# Patient Record
Sex: FEMALE | Race: BLACK OR AFRICAN AMERICAN | NOT HISPANIC OR LATINO | Employment: UNEMPLOYED | URBAN - NONMETROPOLITAN AREA
[De-identification: names, ages, dates, MRNs, and addresses within clinical notes are randomized per-mention and may not be internally consistent; named-entity substitution may affect disease eponyms.]

---

## 2017-01-01 ENCOUNTER — HOSPITAL ENCOUNTER (INPATIENT)
Facility: HOSPITAL | Age: 82
LOS: 5 days | End: 2017-11-30
Attending: FAMILY MEDICINE | Admitting: INTERNAL MEDICINE

## 2017-01-01 ENCOUNTER — OFFICE VISIT (OUTPATIENT)
Dept: PODIATRY | Facility: CLINIC | Age: 82
End: 2017-01-01

## 2017-01-01 ENCOUNTER — TELEPHONE (OUTPATIENT)
Dept: FAMILY MEDICINE CLINIC | Facility: CLINIC | Age: 82
End: 2017-01-01

## 2017-01-01 ENCOUNTER — APPOINTMENT (OUTPATIENT)
Dept: GENERAL RADIOLOGY | Facility: HOSPITAL | Age: 82
End: 2017-01-01

## 2017-01-01 ENCOUNTER — HOSPITAL ENCOUNTER (INPATIENT)
Facility: HOSPITAL | Age: 82
LOS: 3 days | Discharge: HOME HEALTH | End: 2017-02-19
Attending: EMERGENCY MEDICINE | Admitting: INTERNAL MEDICINE

## 2017-01-01 ENCOUNTER — OFFICE VISIT (OUTPATIENT)
Dept: FAMILY MEDICINE CLINIC | Facility: CLINIC | Age: 82
End: 2017-01-01

## 2017-01-01 VITALS — HEIGHT: 60 IN | WEIGHT: 119 LBS | BODY MASS INDEX: 23.36 KG/M2

## 2017-01-01 VITALS
BODY MASS INDEX: 23.39 KG/M2 | TEMPERATURE: 96.8 F | SYSTOLIC BLOOD PRESSURE: 142 MMHG | RESPIRATION RATE: 18 BRPM | WEIGHT: 119.13 LBS | DIASTOLIC BLOOD PRESSURE: 65 MMHG | OXYGEN SATURATION: 90 % | HEART RATE: 80 BPM | HEIGHT: 60 IN

## 2017-01-01 VITALS
SYSTOLIC BLOOD PRESSURE: 110 MMHG | HEIGHT: 60 IN | WEIGHT: 119 LBS | HEART RATE: 64 BPM | DIASTOLIC BLOOD PRESSURE: 70 MMHG | OXYGEN SATURATION: 95 % | BODY MASS INDEX: 23.36 KG/M2

## 2017-01-01 VITALS — BODY MASS INDEX: 23.24 KG/M2 | HEIGHT: 60 IN

## 2017-01-01 VITALS — WEIGHT: 119 LBS | HEIGHT: 60 IN | BODY MASS INDEX: 23.36 KG/M2

## 2017-01-01 VITALS
HEIGHT: 62 IN | WEIGHT: 121.47 LBS | BODY MASS INDEX: 22.35 KG/M2 | DIASTOLIC BLOOD PRESSURE: 40 MMHG | TEMPERATURE: 99 F | SYSTOLIC BLOOD PRESSURE: 60 MMHG | OXYGEN SATURATION: 80 % | HEART RATE: 66 BPM | RESPIRATION RATE: 12 BRPM

## 2017-01-01 VITALS — HEIGHT: 60 IN | BODY MASS INDEX: 23.36 KG/M2 | WEIGHT: 119 LBS

## 2017-01-01 VITALS — WEIGHT: 119 LBS | BODY MASS INDEX: 23.36 KG/M2 | HEIGHT: 60 IN

## 2017-01-01 DIAGNOSIS — I50.23 ACUTE ON CHRONIC SYSTOLIC CONGESTIVE HEART FAILURE (HCC): Primary | ICD-10-CM

## 2017-01-01 DIAGNOSIS — G89.29 CHRONIC TOE PAIN, BILATERAL: ICD-10-CM

## 2017-01-01 DIAGNOSIS — M79.674 CHRONIC TOE PAIN, BILATERAL: ICD-10-CM

## 2017-01-01 DIAGNOSIS — B35.1 ONYCHOMYCOSIS: Primary | ICD-10-CM

## 2017-01-01 DIAGNOSIS — L97.512 ULCER OF RIGHT SECOND TOE WITH FAT LAYER EXPOSED (HCC): Primary | ICD-10-CM

## 2017-01-01 DIAGNOSIS — M79.674 CHRONIC PAIN OF TOE OF RIGHT FOOT: ICD-10-CM

## 2017-01-01 DIAGNOSIS — T68.XXXA HYPOTHERMIA, INITIAL ENCOUNTER: ICD-10-CM

## 2017-01-01 DIAGNOSIS — I50.9 ACUTE ON CHRONIC CONGESTIVE HEART FAILURE, UNSPECIFIED CONGESTIVE HEART FAILURE TYPE: Primary | ICD-10-CM

## 2017-01-01 DIAGNOSIS — G89.29 CHRONIC PAIN OF TOE OF LEFT FOOT: ICD-10-CM

## 2017-01-01 DIAGNOSIS — M79.675 CHRONIC TOE PAIN, BILATERAL: ICD-10-CM

## 2017-01-01 DIAGNOSIS — E86.0 DEHYDRATION: ICD-10-CM

## 2017-01-01 DIAGNOSIS — M79.675 CHRONIC PAIN OF TOE OF LEFT FOOT: ICD-10-CM

## 2017-01-01 DIAGNOSIS — E11.8 DIABETIC FOOT (HCC): ICD-10-CM

## 2017-01-01 DIAGNOSIS — F03.90 SENILE DEMENTIA, WITHOUT BEHAVIORAL DISTURBANCE (HCC): ICD-10-CM

## 2017-01-01 DIAGNOSIS — E11.9 TYPE 2 DIABETES MELLITUS WITHOUT COMPLICATION, UNSPECIFIED LONG TERM INSULIN USE STATUS: ICD-10-CM

## 2017-01-01 DIAGNOSIS — G89.29 CHRONIC PAIN OF TOE OF RIGHT FOOT: ICD-10-CM

## 2017-01-01 DIAGNOSIS — J18.9 PNEUMONIA DUE TO ORGANISM: Primary | ICD-10-CM

## 2017-01-01 DIAGNOSIS — B35.1 ONYCHOMYCOSIS: ICD-10-CM

## 2017-01-01 LAB
ALBUMIN SERPL-MCNC: 2.2 G/DL (ref 3.4–4.8)
ALBUMIN SERPL-MCNC: 2.5 G/DL (ref 3.4–4.8)
ALBUMIN SERPL-MCNC: 2.8 G/DL (ref 3.4–4.8)
ALBUMIN SERPL-MCNC: 3.5 G/DL (ref 3.4–4.8)
ALBUMIN/GLOB SERPL: 0.8 G/DL (ref 1.1–1.8)
ALBUMIN/GLOB SERPL: 0.8 G/DL (ref 1.1–1.8)
ALBUMIN/GLOB SERPL: 0.9 G/DL (ref 1.1–1.8)
ALBUMIN/GLOB SERPL: 1 G/DL (ref 1.1–1.8)
ALP SERPL-CCNC: 122 U/L (ref 38–126)
ALP SERPL-CCNC: 135 U/L (ref 38–126)
ALP SERPL-CCNC: 138 U/L (ref 38–126)
ALP SERPL-CCNC: 89 U/L (ref 38–126)
ALT SERPL W P-5'-P-CCNC: 28 U/L (ref 9–52)
ALT SERPL W P-5'-P-CCNC: 34 U/L (ref 9–52)
ALT SERPL W P-5'-P-CCNC: 36 U/L (ref 9–52)
ALT SERPL W P-5'-P-CCNC: 40 U/L (ref 9–52)
ANION GAP SERPL CALCULATED.3IONS-SCNC: 11 MMOL/L (ref 5–15)
ANION GAP SERPL CALCULATED.3IONS-SCNC: 11 MMOL/L (ref 5–15)
ANION GAP SERPL CALCULATED.3IONS-SCNC: 5 MMOL/L (ref 5–15)
ANION GAP SERPL CALCULATED.3IONS-SCNC: 6 MMOL/L (ref 5–15)
ANION GAP SERPL CALCULATED.3IONS-SCNC: 6 MMOL/L (ref 5–15)
ANION GAP SERPL CALCULATED.3IONS-SCNC: 8 MMOL/L (ref 5–15)
APTT PPP: <20 SECONDS (ref 20–40.3)
ARTERIAL PATENCY WRIST A: ABNORMAL
AST SERPL-CCNC: 146 U/L (ref 14–36)
AST SERPL-CCNC: 19 U/L (ref 14–36)
AST SERPL-CCNC: 31 U/L (ref 14–36)
AST SERPL-CCNC: 71 U/L (ref 14–36)
ATMOSPHERIC PRESS: ABNORMAL MMHG
BACTERIA SPEC AEROBE CULT: NORMAL
BACTERIA UR QL AUTO: ABNORMAL /HPF
BASE EXCESS BLDA CALC-SCNC: -5.1 MMOL/L (ref -2.4–2.4)
BASOPHILS # BLD AUTO: 0.01 10*3/MM3 (ref 0–0.2)
BASOPHILS NFR BLD AUTO: 0.1 % (ref 0–2)
BDY SITE: ABNORMAL
BILIRUB SERPL-MCNC: 0.3 MG/DL (ref 0.2–1.3)
BILIRUB SERPL-MCNC: <0.1 MG/DL (ref 0.2–1.3)
BILIRUB UR QL STRIP: NEGATIVE
BUN BLD-MCNC: 26 MG/DL (ref 7–21)
BUN BLD-MCNC: 39 MG/DL (ref 7–21)
BUN BLD-MCNC: 40 MG/DL (ref 7–21)
BUN BLD-MCNC: 40 MG/DL (ref 7–21)
BUN BLD-MCNC: 42 MG/DL (ref 7–21)
BUN BLD-MCNC: 49 MG/DL (ref 7–21)
BUN/CREAT SERPL: 19.9 (ref 7–25)
BUN/CREAT SERPL: 20.5 (ref 7–25)
BUN/CREAT SERPL: 23.2 (ref 7–25)
BUN/CREAT SERPL: 25.1 (ref 7–25)
BUN/CREAT SERPL: 35.5 (ref 7–25)
BUN/CREAT SERPL: 37.7 (ref 7–25)
CA-I BLD-MCNC: 5 MG/DL (ref 4.5–4.9)
CA-I BLD-MCNC: 5.1 MG/DL (ref 4.5–4.9)
CALCIUM SPEC-SCNC: 8.5 MG/DL (ref 8.4–10.2)
CALCIUM SPEC-SCNC: 8.8 MG/DL (ref 8.4–10.2)
CALCIUM SPEC-SCNC: 9 MG/DL (ref 8.4–10.2)
CALCIUM SPEC-SCNC: 9.1 MG/DL (ref 8.4–10.2)
CALCIUM SPEC-SCNC: 9.1 MG/DL (ref 8.4–10.2)
CALCIUM SPEC-SCNC: 9.7 MG/DL (ref 8.4–10.2)
CHLORIDE SERPL-SCNC: 117 MMOL/L (ref 95–110)
CHLORIDE SERPL-SCNC: 119 MMOL/L (ref 95–110)
CHLORIDE SERPL-SCNC: 120 MMOL/L (ref 95–110)
CHLORIDE SERPL-SCNC: 92 MMOL/L (ref 95–110)
CHLORIDE SERPL-SCNC: 95 MMOL/L (ref 95–110)
CHLORIDE SERPL-SCNC: 99 MMOL/L (ref 95–110)
CLARITY UR: CLEAR
CO2 BLDA-SCNC: 19.9 MMOL/L (ref 23–27)
CO2 SERPL-SCNC: 18 MMOL/L (ref 22–31)
CO2 SERPL-SCNC: 20 MMOL/L (ref 22–31)
CO2 SERPL-SCNC: 22 MMOL/L (ref 22–31)
CO2 SERPL-SCNC: 26 MMOL/L (ref 22–31)
CO2 SERPL-SCNC: 27 MMOL/L (ref 22–31)
CO2 SERPL-SCNC: 29 MMOL/L (ref 22–31)
COLOR UR: ABNORMAL
CREAT BLD-MCNC: 1.1 MG/DL (ref 0.5–1)
CREAT BLD-MCNC: 1.12 MG/DL (ref 0.5–1)
CREAT BLD-MCNC: 1.3 MG/DL (ref 0.5–1)
CREAT BLD-MCNC: 1.67 MG/DL (ref 0.5–1)
CREAT BLD-MCNC: 1.95 MG/DL (ref 0.5–1)
CREAT BLD-MCNC: 2.01 MG/DL (ref 0.5–1)
CRP SERPL-MCNC: 0.9 MG/DL (ref 0–1)
D-LACTATE SERPL-SCNC: 1.1 MMOL/L (ref 0.5–2)
D-LACTATE SERPL-SCNC: 1.3 MMOL/L (ref 0.5–2)
DEPRECATED RDW RBC AUTO: 45.4 FL (ref 36.4–46.3)
DEPRECATED RDW RBC AUTO: 57.8 FL (ref 36.4–46.3)
DEPRECATED RDW RBC AUTO: 58.8 FL (ref 36.4–46.3)
DEPRECATED RDW RBC AUTO: 59.3 FL (ref 36.4–46.3)
EOSINOPHIL # BLD AUTO: 0 10*3/MM3 (ref 0–0.7)
EOSINOPHIL # BLD AUTO: 0.02 10*3/MM3 (ref 0–0.7)
EOSINOPHIL # BLD AUTO: 0.03 10*3/MM3 (ref 0–0.7)
EOSINOPHIL NFR BLD AUTO: 0 % (ref 0–7)
EOSINOPHIL NFR BLD AUTO: 0.2 % (ref 0–7)
EOSINOPHIL NFR BLD AUTO: 0.4 % (ref 0–7)
ERYTHROCYTE [DISTWIDTH] IN BLOOD BY AUTOMATED COUNT: 13.9 % (ref 11.5–14.5)
ERYTHROCYTE [DISTWIDTH] IN BLOOD BY AUTOMATED COUNT: 18.3 % (ref 11.5–14.5)
ERYTHROCYTE [DISTWIDTH] IN BLOOD BY AUTOMATED COUNT: 18.4 % (ref 11.5–14.5)
ERYTHROCYTE [DISTWIDTH] IN BLOOD BY AUTOMATED COUNT: 18.7 % (ref 11.5–14.5)
GFR SERPL CREATININE-BSD FRML MDRD: 28 ML/MIN/1.73 (ref 39–90)
GFR SERPL CREATININE-BSD FRML MDRD: 29 ML/MIN/1.73 (ref 39–90)
GFR SERPL CREATININE-BSD FRML MDRD: 34 ML/MIN/1.73 (ref 39–90)
GFR SERPL CREATININE-BSD FRML MDRD: 46 ML/MIN/1.73 (ref 39–90)
GFR SERPL CREATININE-BSD FRML MDRD: 54 ML/MIN/1.73 (ref 39–90)
GFR SERPL CREATININE-BSD FRML MDRD: 56 ML/MIN/1.73 (ref 39–90)
GLOBULIN UR ELPH-MCNC: 2.5 GM/DL (ref 2.3–3.5)
GLOBULIN UR ELPH-MCNC: 2.8 GM/DL (ref 2.3–3.5)
GLOBULIN UR ELPH-MCNC: 3.4 GM/DL (ref 2.3–3.5)
GLOBULIN UR ELPH-MCNC: 3.7 GM/DL (ref 2.3–3.5)
GLUCOSE BLD-MCNC: 101 MG/DL (ref 60–100)
GLUCOSE BLD-MCNC: 145 MG/DL (ref 60–100)
GLUCOSE BLD-MCNC: 156 MG/DL (ref 60–100)
GLUCOSE BLD-MCNC: 159 MG/DL (ref 60–100)
GLUCOSE BLD-MCNC: 83 MG/DL (ref 60–100)
GLUCOSE BLD-MCNC: 87 MG/DL (ref 60–100)
GLUCOSE BLDA-MCNC: 84 MMOL/L
GLUCOSE UR STRIP-MCNC: NEGATIVE MG/DL
HCO3 BLDA-SCNC: 18.9 MMOL/L (ref 22–26)
HCT VFR BLD AUTO: 25.6 % (ref 35–45)
HCT VFR BLD AUTO: 27.7 % (ref 35–45)
HCT VFR BLD AUTO: 29.6 % (ref 35–45)
HCT VFR BLD AUTO: 34.4 % (ref 35–45)
HCT VFR BLD CALC: 29 % (ref 38–47)
HGB BLD-MCNC: 10.2 G/DL (ref 12–15.5)
HGB BLD-MCNC: 12.2 G/DL (ref 12–15.5)
HGB BLD-MCNC: 8.7 G/DL (ref 12–15.5)
HGB BLD-MCNC: 9.8 G/DL (ref 12–15.5)
HGB BLDA-MCNC: 9.7 G/DL (ref 12–16)
HGB UR QL STRIP.AUTO: NEGATIVE
HOLD SPECIMEN: NORMAL
HOLD SPECIMEN: NORMAL
HYALINE CASTS UR QL AUTO: ABNORMAL /LPF
IMM GRANULOCYTES # BLD: 0.01 10*3/MM3 (ref 0–0.02)
IMM GRANULOCYTES # BLD: 0.02 10*3/MM3 (ref 0–0.02)
IMM GRANULOCYTES # BLD: 0.06 10*3/MM3 (ref 0–0.02)
IMM GRANULOCYTES NFR BLD: 0.1 % (ref 0–0.5)
IMM GRANULOCYTES NFR BLD: 0.2 % (ref 0–0.5)
IMM GRANULOCYTES NFR BLD: 0.6 % (ref 0–0.5)
INR PPP: 0.91 (ref 0.8–1.2)
INR PPP: 0.95 (ref 0.8–1.2)
KETONES UR QL STRIP: ABNORMAL
LEUKOCYTE ESTERASE UR QL STRIP.AUTO: NEGATIVE
LYMPHOCYTES # BLD AUTO: 0.8 10*3/MM3 (ref 0.6–4.2)
LYMPHOCYTES # BLD AUTO: 1.09 10*3/MM3 (ref 0.6–4.2)
LYMPHOCYTES # BLD AUTO: 2.37 10*3/MM3 (ref 0.6–4.2)
LYMPHOCYTES NFR BLD AUTO: 12.8 % (ref 10–50)
LYMPHOCYTES NFR BLD AUTO: 30.5 % (ref 10–50)
LYMPHOCYTES NFR BLD AUTO: 8.1 % (ref 10–50)
MAGNESIUM SERPL-MCNC: 2.2 MG/DL (ref 1.6–2.3)
MCH RBC QN AUTO: 30.1 PG (ref 26.5–34)
MCH RBC QN AUTO: 30.4 PG (ref 26.5–34)
MCH RBC QN AUTO: 31.3 PG (ref 26.5–34)
MCH RBC QN AUTO: 31.5 PG (ref 26.5–34)
MCHC RBC AUTO-ENTMCNC: 34 G/DL (ref 31.4–36)
MCHC RBC AUTO-ENTMCNC: 34.5 G/DL (ref 31.4–36)
MCHC RBC AUTO-ENTMCNC: 35.4 G/DL (ref 31.4–36)
MCHC RBC AUTO-ENTMCNC: 35.5 G/DL (ref 31.4–36)
MCV RBC AUTO: 88.2 FL (ref 80–98)
MCV RBC AUTO: 88.4 FL (ref 80–98)
MCV RBC AUTO: 88.6 FL (ref 80–98)
MCV RBC AUTO: 89.1 FL (ref 80–98)
MODALITY: ABNORMAL
MONOCYTES # BLD AUTO: 0.33 10*3/MM3 (ref 0–0.9)
MONOCYTES # BLD AUTO: 0.34 10*3/MM3 (ref 0–0.9)
MONOCYTES # BLD AUTO: 0.67 10*3/MM3 (ref 0–0.9)
MONOCYTES NFR BLD AUTO: 3.3 % (ref 0–12)
MONOCYTES NFR BLD AUTO: 4 % (ref 0–12)
MONOCYTES NFR BLD AUTO: 8.6 % (ref 0–12)
NEUTROPHILS # BLD AUTO: 4.68 10*3/MM3 (ref 2–8.6)
NEUTROPHILS # BLD AUTO: 7.02 10*3/MM3 (ref 2–8.6)
NEUTROPHILS # BLD AUTO: 8.73 10*3/MM3 (ref 2–8.6)
NEUTROPHILS NFR BLD AUTO: 60.3 % (ref 37–80)
NEUTROPHILS NFR BLD AUTO: 82.7 % (ref 37–80)
NEUTROPHILS NFR BLD AUTO: 87.9 % (ref 37–80)
NITRITE UR QL STRIP: NEGATIVE
NRBC BLD MANUAL-RTO: 0.8 /100 WBC (ref 0–0)
NT-PROBNP SERPL-MCNC: 2070 PG/ML (ref 0–1800)
PCO2 BLDA: 31.5 MM HG (ref 35–45)
PH BLDA: 7.4 PH UNITS (ref 7.35–7.45)
PH UR STRIP.AUTO: 5.5 [PH] (ref 5–9)
PHOSPHATE SERPL-MCNC: 3 MG/DL (ref 2.4–4.4)
PLATELET # BLD AUTO: 165 10*3/MM3 (ref 150–450)
PLATELET # BLD AUTO: 209 10*3/MM3 (ref 150–450)
PLATELET # BLD AUTO: 219 10*3/MM3 (ref 150–450)
PLATELET # BLD AUTO: 278 10*3/MM3 (ref 150–450)
PMV BLD AUTO: 10.5 FL (ref 8–12)
PMV BLD AUTO: 11 FL (ref 8–12)
PMV BLD AUTO: 12 FL (ref 8–12)
PMV BLD AUTO: 9.6 FL (ref 8–12)
PO2 BLDA: 142.1 MM HG (ref 80–105)
POTASSIUM BLD-SCNC: 3.7 MMOL/L (ref 3.5–5.1)
POTASSIUM BLD-SCNC: 4 MMOL/L (ref 3.5–5.1)
POTASSIUM BLD-SCNC: 4.1 MMOL/L (ref 3.5–5.1)
POTASSIUM BLD-SCNC: 4.2 MMOL/L (ref 3.5–5.1)
POTASSIUM BLD-SCNC: 4.8 MMOL/L (ref 3.5–5.1)
POTASSIUM BLD-SCNC: 4.8 MMOL/L (ref 3.5–5.1)
POTASSIUM BLDA-SCNC: 3.86 MMOL/L (ref 3.6–4.9)
PROCALCITONIN SERPL-MCNC: 0.1 NG/ML (ref 0–0.08)
PROT SERPL-MCNC: 5 G/DL (ref 6.3–8.6)
PROT SERPL-MCNC: 5 G/DL (ref 6.3–8.6)
PROT SERPL-MCNC: 6.2 G/DL (ref 6.3–8.6)
PROT SERPL-MCNC: 7.2 G/DL (ref 6.3–8.6)
PROT UR QL STRIP: ABNORMAL
PROTHROMBIN TIME: 12.2 SECONDS (ref 11.1–15.3)
PROTHROMBIN TIME: 12.7 SECONDS (ref 11.1–15.3)
RBC # BLD AUTO: 2.89 10*6/MM3 (ref 3.77–5.16)
RBC # BLD AUTO: 3.11 10*6/MM3 (ref 3.77–5.16)
RBC # BLD AUTO: 3.35 10*6/MM3 (ref 3.77–5.16)
RBC # BLD AUTO: 3.9 10*6/MM3 (ref 3.77–5.16)
RBC # UR: ABNORMAL /HPF
REF LAB TEST METHOD: ABNORMAL
SAO2 % BLDCOA: 98.6 %
SODIUM BLD-SCNC: 126 MMOL/L (ref 137–145)
SODIUM BLD-SCNC: 129 MMOL/L (ref 137–145)
SODIUM BLD-SCNC: 132 MMOL/L (ref 137–145)
SODIUM BLD-SCNC: 145 MMOL/L (ref 137–145)
SODIUM BLD-SCNC: 149 MMOL/L (ref 137–145)
SODIUM BLD-SCNC: 150 MMOL/L (ref 137–145)
SODIUM BLDA-SCNC: 145.2 MMOL/L (ref 138–146)
SP GR UR STRIP: 1.02 (ref 1–1.03)
SQUAMOUS #/AREA URNS HPF: ABNORMAL /HPF
TROPONIN I SERPL-MCNC: 0.02 NG/ML
TSH SERPL DL<=0.05 MIU/L-ACNC: 8.55 MIU/ML (ref 0.46–4.68)
UROBILINOGEN UR QL STRIP: ABNORMAL
VANCOMYCIN SERPL-MCNC: 10.84 MCG/ML
VANCOMYCIN SERPL-MCNC: 19.8 MCG/ML
WBC NRBC COR # BLD: 7.77 10*3/MM3 (ref 3.2–9.8)
WBC NRBC COR # BLD: 7.94 10*3/MM3 (ref 3.2–9.8)
WBC NRBC COR # BLD: 8.5 10*3/MM3 (ref 3.2–9.8)
WBC NRBC COR # BLD: 9.93 10*3/MM3 (ref 3.2–9.8)
WBC UR QL AUTO: ABNORMAL /HPF
WHOLE BLOOD HOLD SPECIMEN: NORMAL
WHOLE BLOOD HOLD SPECIMEN: NORMAL

## 2017-01-01 PROCEDURE — 25010000002 VANCOMYCIN PER 500 MG: Performed by: FAMILY MEDICINE

## 2017-01-01 PROCEDURE — 99212 OFFICE O/P EST SF 10 MIN: CPT | Performed by: PODIATRIST

## 2017-01-01 PROCEDURE — 25010000002 ALBUMIN HUMAN 5% PER 50 ML: Performed by: INTERNAL MEDICINE

## 2017-01-01 PROCEDURE — 93005 ELECTROCARDIOGRAM TRACING: CPT | Performed by: EMERGENCY MEDICINE

## 2017-01-01 PROCEDURE — 99213 OFFICE O/P EST LOW 20 MIN: CPT | Performed by: GENERAL PRACTICE

## 2017-01-01 PROCEDURE — 99285 EMERGENCY DEPT VISIT HI MDM: CPT

## 2017-01-01 PROCEDURE — 25010000002 MEROPENEM PER 100 MG: Performed by: INTERNAL MEDICINE

## 2017-01-01 PROCEDURE — P9041 ALBUMIN (HUMAN),5%, 50ML: HCPCS | Performed by: INTERNAL MEDICINE

## 2017-01-01 PROCEDURE — 25010000002 DIGOXIN PER 500 MCG: Performed by: INTERNAL MEDICINE

## 2017-01-01 PROCEDURE — 25010000002 MORPHINE PER 10 MG: Performed by: FAMILY MEDICINE

## 2017-01-01 PROCEDURE — 85027 COMPLETE CBC AUTOMATED: CPT | Performed by: INTERNAL MEDICINE

## 2017-01-01 PROCEDURE — 94760 N-INVAS EAR/PLS OXIMETRY 1: CPT

## 2017-01-01 PROCEDURE — 25010000002 FUROSEMIDE PER 20 MG: Performed by: HOSPITALIST

## 2017-01-01 PROCEDURE — 25010000002 CEFTRIAXONE PER 250 MG: Performed by: FAMILY MEDICINE

## 2017-01-01 PROCEDURE — 25010000002 PIPERACILLIN SOD-TAZOBACTAM PER 1 G: Performed by: INTERNAL MEDICINE

## 2017-01-01 PROCEDURE — 83880 ASSAY OF NATRIURETIC PEPTIDE: CPT | Performed by: EMERGENCY MEDICINE

## 2017-01-01 PROCEDURE — 36600 WITHDRAWAL OF ARTERIAL BLOOD: CPT

## 2017-01-01 PROCEDURE — 81001 URINALYSIS AUTO W/SCOPE: CPT | Performed by: FAMILY MEDICINE

## 2017-01-01 PROCEDURE — 71010 HC CHEST PA OR AP: CPT

## 2017-01-01 PROCEDURE — 85025 COMPLETE CBC W/AUTO DIFF WBC: CPT | Performed by: INTERNAL MEDICINE

## 2017-01-01 PROCEDURE — G0378 HOSPITAL OBSERVATION PER HR: HCPCS

## 2017-01-01 PROCEDURE — 80202 ASSAY OF VANCOMYCIN: CPT | Performed by: INTERNAL MEDICINE

## 2017-01-01 PROCEDURE — 94640 AIRWAY INHALATION TREATMENT: CPT

## 2017-01-01 PROCEDURE — 85610 PROTHROMBIN TIME: CPT | Performed by: FAMILY MEDICINE

## 2017-01-01 PROCEDURE — 80053 COMPREHEN METABOLIC PANEL: CPT | Performed by: INTERNAL MEDICINE

## 2017-01-01 PROCEDURE — 94799 UNLISTED PULMONARY SVC/PX: CPT

## 2017-01-01 PROCEDURE — 25010000002 AZITHROMYCIN PER 500 MG: Performed by: FAMILY MEDICINE

## 2017-01-01 PROCEDURE — 83605 ASSAY OF LACTIC ACID: CPT | Performed by: EMERGENCY MEDICINE

## 2017-01-01 PROCEDURE — 84100 ASSAY OF PHOSPHORUS: CPT | Performed by: FAMILY MEDICINE

## 2017-01-01 PROCEDURE — 85610 PROTHROMBIN TIME: CPT | Performed by: EMERGENCY MEDICINE

## 2017-01-01 PROCEDURE — 93005 ELECTROCARDIOGRAM TRACING: CPT | Performed by: FAMILY MEDICINE

## 2017-01-01 PROCEDURE — 11721 DEBRIDE NAIL 6 OR MORE: CPT | Performed by: PODIATRIST

## 2017-01-01 PROCEDURE — 83735 ASSAY OF MAGNESIUM: CPT | Performed by: FAMILY MEDICINE

## 2017-01-01 PROCEDURE — 84145 PROCALCITONIN (PCT): CPT | Performed by: EMERGENCY MEDICINE

## 2017-01-01 PROCEDURE — 84443 ASSAY THYROID STIM HORMONE: CPT | Performed by: FAMILY MEDICINE

## 2017-01-01 PROCEDURE — 85025 COMPLETE CBC W/AUTO DIFF WBC: CPT | Performed by: FAMILY MEDICINE

## 2017-01-01 PROCEDURE — 82803 BLOOD GASES ANY COMBINATION: CPT | Performed by: FAMILY MEDICINE

## 2017-01-01 PROCEDURE — 25010000002 LORAZEPAM PER 2 MG: Performed by: FAMILY MEDICINE

## 2017-01-01 PROCEDURE — 87086 URINE CULTURE/COLONY COUNT: CPT | Performed by: FAMILY MEDICINE

## 2017-01-01 PROCEDURE — 25010000002 PIPERACILLIN SOD-TAZOBACTAM PER 1 G: Performed by: FAMILY MEDICINE

## 2017-01-01 PROCEDURE — 87040 BLOOD CULTURE FOR BACTERIA: CPT | Performed by: EMERGENCY MEDICINE

## 2017-01-01 PROCEDURE — 80053 COMPREHEN METABOLIC PANEL: CPT | Performed by: FAMILY MEDICINE

## 2017-01-01 PROCEDURE — 93010 ELECTROCARDIOGRAM REPORT: CPT | Performed by: INTERNAL MEDICINE

## 2017-01-01 PROCEDURE — 80048 BASIC METABOLIC PNL TOTAL CA: CPT | Performed by: INTERNAL MEDICINE

## 2017-01-01 PROCEDURE — 83605 ASSAY OF LACTIC ACID: CPT | Performed by: FAMILY MEDICINE

## 2017-01-01 PROCEDURE — 80053 COMPREHEN METABOLIC PANEL: CPT | Performed by: EMERGENCY MEDICINE

## 2017-01-01 PROCEDURE — 86140 C-REACTIVE PROTEIN: CPT | Performed by: FAMILY MEDICINE

## 2017-01-01 PROCEDURE — 85730 THROMBOPLASTIN TIME PARTIAL: CPT | Performed by: FAMILY MEDICINE

## 2017-01-01 PROCEDURE — 87040 BLOOD CULTURE FOR BACTERIA: CPT | Performed by: FAMILY MEDICINE

## 2017-01-01 PROCEDURE — 84484 ASSAY OF TROPONIN QUANT: CPT | Performed by: EMERGENCY MEDICINE

## 2017-01-01 PROCEDURE — 82330 ASSAY OF CALCIUM: CPT | Performed by: FAMILY MEDICINE

## 2017-01-01 PROCEDURE — 99213 OFFICE O/P EST LOW 20 MIN: CPT | Performed by: PODIATRIST

## 2017-01-01 RX ORDER — NYSTATIN 100000 U/G
OINTMENT TOPICAL EVERY 12 HOURS SCHEDULED
Status: DISCONTINUED | OUTPATIENT
Start: 2017-01-01 | End: 2017-01-01

## 2017-01-01 RX ORDER — MORPHINE SULFATE 2 MG/ML
0.5 INJECTION, SOLUTION INTRAMUSCULAR; INTRAVENOUS EVERY 4 HOURS PRN
Status: DISCONTINUED | OUTPATIENT
Start: 2017-01-01 | End: 2017-01-01

## 2017-01-01 RX ORDER — SCOLOPAMINE TRANSDERMAL SYSTEM 1 MG/1
1 PATCH, EXTENDED RELEASE TRANSDERMAL
Status: DISCONTINUED | OUTPATIENT
Start: 2017-01-01 | End: 2017-01-01

## 2017-01-01 RX ORDER — IPRATROPIUM BROMIDE AND ALBUTEROL SULFATE 2.5; .5 MG/3ML; MG/3ML
3 SOLUTION RESPIRATORY (INHALATION)
Status: DISCONTINUED | OUTPATIENT
Start: 2017-01-01 | End: 2017-01-01

## 2017-01-01 RX ORDER — LOSARTAN POTASSIUM 25 MG/1
TABLET ORAL
Qty: 15 TABLET | Refills: 2 | Status: SHIPPED | OUTPATIENT
Start: 2017-01-01 | End: 2017-01-01 | Stop reason: SDUPTHER

## 2017-01-01 RX ORDER — ALLOPURINOL 100 MG/1
100 TABLET ORAL DAILY
Status: DISCONTINUED | OUTPATIENT
Start: 2017-01-01 | End: 2017-01-01 | Stop reason: HOSPADM

## 2017-01-01 RX ORDER — MELATONIN
1000 DAILY
Status: DISCONTINUED | OUTPATIENT
Start: 2017-01-01 | End: 2017-01-01 | Stop reason: HOSPADM

## 2017-01-01 RX ORDER — MEGESTROL ACETATE 40 MG/ML
SUSPENSION ORAL
Qty: 300 ML | Refills: 3 | Status: SHIPPED | OUTPATIENT
Start: 2017-01-01 | End: 2017-01-01 | Stop reason: SDUPTHER

## 2017-01-01 RX ORDER — MORPHINE SULFATE 2 MG/ML
2 INJECTION, SOLUTION INTRAMUSCULAR; INTRAVENOUS
Status: DISCONTINUED | OUTPATIENT
Start: 2017-01-01 | End: 2017-01-01 | Stop reason: HOSPADM

## 2017-01-01 RX ORDER — NALOXONE HCL 0.4 MG/ML
0.4 VIAL (ML) INJECTION
Status: DISCONTINUED | OUTPATIENT
Start: 2017-01-01 | End: 2017-01-01 | Stop reason: HOSPADM

## 2017-01-01 RX ORDER — SODIUM CHLORIDE 0.9 % (FLUSH) 0.9 %
10 SYRINGE (ML) INJECTION AS NEEDED
Status: DISCONTINUED | OUTPATIENT
Start: 2017-01-01 | End: 2017-01-01

## 2017-01-01 RX ORDER — LORAZEPAM 2 MG/ML
1 INJECTION INTRAMUSCULAR EVERY 4 HOURS PRN
Status: DISCONTINUED | OUTPATIENT
Start: 2017-01-01 | End: 2017-01-01 | Stop reason: HOSPADM

## 2017-01-01 RX ORDER — MEGESTROL ACETATE 40 MG/ML
200 SUSPENSION ORAL DAILY
Status: DISCONTINUED | OUTPATIENT
Start: 2017-01-01 | End: 2017-01-01 | Stop reason: HOSPADM

## 2017-01-01 RX ORDER — LOSARTAN POTASSIUM 25 MG/1
12.5 TABLET ORAL DAILY PRN
Status: DISCONTINUED | OUTPATIENT
Start: 2017-01-01 | End: 2017-01-01 | Stop reason: HOSPADM

## 2017-01-01 RX ORDER — DIGOXIN 0.25 MG/ML
250 INJECTION INTRAMUSCULAR; INTRAVENOUS ONCE
Status: COMPLETED | OUTPATIENT
Start: 2017-01-01 | End: 2017-01-01

## 2017-01-01 RX ORDER — OFLOXACIN 3 MG/ML
1 SOLUTION/ DROPS OPHTHALMIC 4 TIMES DAILY
Status: DISCONTINUED | OUTPATIENT
Start: 2017-01-01 | End: 2017-01-01

## 2017-01-01 RX ORDER — SODIUM CHLORIDE 0.9 % (FLUSH) 0.9 %
10 SYRINGE (ML) INJECTION AS NEEDED
Status: DISCONTINUED | OUTPATIENT
Start: 2017-01-01 | End: 2017-01-01 | Stop reason: HOSPADM

## 2017-01-01 RX ORDER — SODIUM CHLORIDE 9 MG/ML
75 INJECTION, SOLUTION INTRAVENOUS CONTINUOUS
Status: DISCONTINUED | OUTPATIENT
Start: 2017-01-01 | End: 2017-01-01

## 2017-01-01 RX ORDER — ALBUMIN, HUMAN INJ 5% 5 %
SOLUTION INTRAVENOUS
Status: DISPENSED
Start: 2017-01-01 | End: 2017-01-01

## 2017-01-01 RX ORDER — SCOLOPAMINE TRANSDERMAL SYSTEM 1 MG/1
1 PATCH, EXTENDED RELEASE TRANSDERMAL
Status: DISCONTINUED | OUTPATIENT
Start: 2017-01-01 | End: 2017-01-01 | Stop reason: HOSPADM

## 2017-01-01 RX ORDER — NALOXONE HCL 0.4 MG/ML
0.4 VIAL (ML) INJECTION
Status: DISCONTINUED | OUTPATIENT
Start: 2017-01-01 | End: 2017-01-01

## 2017-01-01 RX ORDER — LATANOPROST 50 UG/ML
1 SOLUTION/ DROPS OPHTHALMIC NIGHTLY
Status: DISCONTINUED | OUTPATIENT
Start: 2017-01-01 | End: 2017-01-01

## 2017-01-01 RX ORDER — DONEPEZIL HYDROCHLORIDE 10 MG/1
10 TABLET, FILM COATED ORAL DAILY
Status: DISCONTINUED | OUTPATIENT
Start: 2017-01-01 | End: 2017-01-01 | Stop reason: HOSPADM

## 2017-01-01 RX ORDER — FUROSEMIDE 20 MG/1
20 TABLET ORAL DAILY
Qty: 30 TABLET | Refills: 0 | Status: SHIPPED | OUTPATIENT
Start: 2017-01-01 | End: 2017-01-01 | Stop reason: ALTCHOICE

## 2017-01-01 RX ORDER — MELATONIN
1000 2 TIMES DAILY
COMMUNITY
End: 2017-01-01 | Stop reason: SDUPTHER

## 2017-01-01 RX ORDER — SODIUM CHLORIDE 0.9 % (FLUSH) 0.9 %
1-10 SYRINGE (ML) INJECTION AS NEEDED
Status: DISCONTINUED | OUTPATIENT
Start: 2017-01-01 | End: 2017-01-01

## 2017-01-01 RX ORDER — DIGOXIN 0.25 MG/ML
250 INJECTION INTRAMUSCULAR; INTRAVENOUS
Status: DISCONTINUED | OUTPATIENT
Start: 2017-01-01 | End: 2017-01-01

## 2017-01-01 RX ORDER — SODIUM CHLORIDE 0.9 % (FLUSH) 0.9 %
1-10 SYRINGE (ML) INJECTION AS NEEDED
Status: DISCONTINUED | OUTPATIENT
Start: 2017-01-01 | End: 2017-01-01 | Stop reason: HOSPADM

## 2017-01-01 RX ORDER — GLUCOSAMINE HCL 500 MG
1 TABLET ORAL 2 TIMES DAILY
COMMUNITY

## 2017-01-01 RX ORDER — ALBUMIN, HUMAN INJ 5% 5 %
25 SOLUTION INTRAVENOUS ONCE
Status: COMPLETED | OUTPATIENT
Start: 2017-01-01 | End: 2017-01-01

## 2017-01-01 RX ORDER — LOSARTAN POTASSIUM 25 MG/1
TABLET ORAL
Qty: 15 TABLET | Refills: 2 | Status: SHIPPED | OUTPATIENT
Start: 2017-01-01

## 2017-01-01 RX ORDER — MORPHINE SULFATE 2 MG/ML
0.5 INJECTION, SOLUTION INTRAMUSCULAR; INTRAVENOUS EVERY 4 HOURS PRN
Status: DISCONTINUED | OUTPATIENT
Start: 2017-01-01 | End: 2017-01-01 | Stop reason: HOSPADM

## 2017-01-01 RX ORDER — ASPIRIN 325 MG
325 TABLET ORAL ONCE
Status: DISCONTINUED | OUTPATIENT
Start: 2017-01-01 | End: 2017-01-01

## 2017-01-01 RX ORDER — CHOLECALCIFEROL (VITAMIN D3) 125 MCG
500 CAPSULE ORAL DAILY
Status: DISCONTINUED | OUTPATIENT
Start: 2017-01-01 | End: 2017-01-01 | Stop reason: HOSPADM

## 2017-01-01 RX ORDER — HEPARIN SODIUM 5000 [USP'U]/ML
5000 INJECTION, SOLUTION INTRAVENOUS; SUBCUTANEOUS EVERY 8 HOURS SCHEDULED
Status: DISCONTINUED | OUTPATIENT
Start: 2017-01-01 | End: 2017-01-01

## 2017-01-01 RX ORDER — MEGESTROL ACETATE 40 MG/ML
SUSPENSION ORAL
Qty: 300 ML | Refills: 3 | Status: SHIPPED | OUTPATIENT
Start: 2017-01-01

## 2017-01-01 RX ORDER — DIAZEPAM 5 MG/ML
5 INJECTION, SOLUTION INTRAMUSCULAR; INTRAVENOUS EVERY 4 HOURS PRN
Status: DISCONTINUED | OUTPATIENT
Start: 2017-01-01 | End: 2017-01-01 | Stop reason: RX

## 2017-01-01 RX ORDER — ASPIRIN 81 MG/1
81 TABLET, CHEWABLE ORAL DAILY
Status: DISCONTINUED | OUTPATIENT
Start: 2017-01-01 | End: 2017-01-01 | Stop reason: HOSPADM

## 2017-01-01 RX ORDER — FAMOTIDINE 20 MG/1
20 TABLET, FILM COATED ORAL DAILY
Status: DISCONTINUED | OUTPATIENT
Start: 2017-01-01 | End: 2017-01-01

## 2017-01-01 RX ORDER — LOSARTAN POTASSIUM 25 MG/1
25 TABLET ORAL
Status: DISCONTINUED | OUTPATIENT
Start: 2017-01-01 | End: 2017-01-01

## 2017-01-01 RX ORDER — ALLOPURINOL 100 MG/1
TABLET ORAL
Qty: 30 TABLET | Refills: 6 | Status: SHIPPED | OUTPATIENT
Start: 2017-01-01

## 2017-01-01 RX ORDER — DEXTROSE MONOHYDRATE 50 MG/ML
75 INJECTION, SOLUTION INTRAVENOUS CONTINUOUS
Status: DISCONTINUED | OUTPATIENT
Start: 2017-01-01 | End: 2017-01-01

## 2017-01-01 RX ORDER — DONEPEZIL HYDROCHLORIDE 10 MG/1
TABLET, FILM COATED ORAL
Qty: 90 TABLET | Refills: 3 | Status: SHIPPED | OUTPATIENT
Start: 2017-01-01

## 2017-01-01 RX ORDER — SCOLOPAMINE TRANSDERMAL SYSTEM 1 MG/1
1 PATCH, EXTENDED RELEASE TRANSDERMAL
Qty: 10 PATCH | Refills: 0 | Status: SHIPPED | OUTPATIENT
Start: 2017-01-01 | End: 2017-01-01

## 2017-01-01 RX ORDER — FUROSEMIDE 10 MG/ML
20 INJECTION INTRAMUSCULAR; INTRAVENOUS DAILY
Status: DISCONTINUED | OUTPATIENT
Start: 2017-01-01 | End: 2017-01-01 | Stop reason: HOSPADM

## 2017-01-01 RX ADMIN — SODIUM CHLORIDE 75 ML/HR: 900 INJECTION, SOLUTION INTRAVENOUS at 09:02

## 2017-01-01 RX ADMIN — LOSARTAN POTASSIUM 12.5 MG: 25 TABLET ORAL at 09:10

## 2017-01-01 RX ADMIN — DONEPEZIL HYDROCHLORIDE 10 MG: 10 TABLET ORAL at 12:41

## 2017-01-01 RX ADMIN — SODIUM CHLORIDE, PRESERVATIVE FREE 10 ML: 5 INJECTION INTRAVENOUS at 16:35

## 2017-01-01 RX ADMIN — ASPIRIN 81 MG 81 MG: 81 TABLET ORAL at 09:32

## 2017-01-01 RX ADMIN — SCOPALAMINE 1 PATCH: 1 PATCH, EXTENDED RELEASE TRANSDERMAL at 11:31

## 2017-01-01 RX ADMIN — NOREPINEPHRINE BITARTRATE 0.02 MCG/KG/MIN: 1 INJECTION INTRAVENOUS at 19:55

## 2017-01-01 RX ADMIN — MORPHINE SULFATE 2 MG: 2 INJECTION, SOLUTION INTRAMUSCULAR; INTRAVENOUS at 14:08

## 2017-01-01 RX ADMIN — SCOPALAMINE 1 PATCH: 1 PATCH, EXTENDED RELEASE TRANSDERMAL at 14:01

## 2017-01-01 RX ADMIN — TAZOBACTAM SODIUM AND PIPERACILLIN SODIUM 2.25 G: 250; 2 INJECTION, SOLUTION INTRAVENOUS at 09:01

## 2017-01-01 RX ADMIN — DONEPEZIL HYDROCHLORIDE 10 MG: 10 TABLET ORAL at 09:07

## 2017-01-01 RX ADMIN — NYSTATIN: 100000 OINTMENT TOPICAL at 09:03

## 2017-01-01 RX ADMIN — IPRATROPIUM BROMIDE 0.5 MG: 0.5 SOLUTION RESPIRATORY (INHALATION) at 16:01

## 2017-01-01 RX ADMIN — ALLOPURINOL 100 MG: 100 TABLET ORAL at 12:41

## 2017-01-01 RX ADMIN — NYSTATIN: 100000 OINTMENT TOPICAL at 10:40

## 2017-01-01 RX ADMIN — ALLOPURINOL 100 MG: 100 TABLET ORAL at 09:06

## 2017-01-01 RX ADMIN — CYANOCOBALAMIN TAB 500 MCG 500 MCG: 500 TAB at 09:11

## 2017-01-01 RX ADMIN — MEROPENEM 500 MG: 500 INJECTION, POWDER, FOR SOLUTION INTRAVENOUS at 10:33

## 2017-01-01 RX ADMIN — IPRATROPIUM BROMIDE 0.5 MG: 0.5 SOLUTION RESPIRATORY (INHALATION) at 22:04

## 2017-01-01 RX ADMIN — TAZOBACTAM SODIUM AND PIPERACILLIN SODIUM 4.5 G: 500; 4 INJECTION, SOLUTION INTRAVENOUS at 17:42

## 2017-01-01 RX ADMIN — CEFTRIAXONE SODIUM 1 G: 1 INJECTION, POWDER, FOR SOLUTION INTRAMUSCULAR; INTRAVENOUS at 13:01

## 2017-01-01 RX ADMIN — DONEPEZIL HYDROCHLORIDE 10 MG: 10 TABLET ORAL at 09:10

## 2017-01-01 RX ADMIN — SODIUM CHLORIDE 75 ML/HR: 900 INJECTION, SOLUTION INTRAVENOUS at 08:36

## 2017-01-01 RX ADMIN — NYSTATIN: 100000 OINTMENT TOPICAL at 20:54

## 2017-01-01 RX ADMIN — AZITHROMYCIN 500 MG: 500 INJECTION, POWDER, LYOPHILIZED, FOR SOLUTION INTRAVENOUS at 13:03

## 2017-01-01 RX ADMIN — NYSTATIN: 100000 OINTMENT TOPICAL at 21:00

## 2017-01-01 RX ADMIN — LORAZEPAM 1 MG: 2 INJECTION, SOLUTION INTRAMUSCULAR; INTRAVENOUS at 16:34

## 2017-01-01 RX ADMIN — NYSTATIN: 100000 OINTMENT TOPICAL at 08:18

## 2017-01-01 RX ADMIN — CYANOCOBALAMIN TAB 500 MCG 500 MCG: 500 TAB at 12:41

## 2017-01-01 RX ADMIN — MEGESTROL ACETATE 200 MG: 40 SUSPENSION ORAL at 09:09

## 2017-01-01 RX ADMIN — CYANOCOBALAMIN TAB 500 MCG 500 MCG: 500 TAB at 09:06

## 2017-01-01 RX ADMIN — DIGOXIN 250 MCG: 0.25 INJECTION INTRAMUSCULAR; INTRAVENOUS at 12:30

## 2017-01-01 RX ADMIN — AZITHROMYCIN 500 MG: 500 INJECTION, POWDER, LYOPHILIZED, FOR SOLUTION INTRAVENOUS at 12:56

## 2017-01-01 RX ADMIN — IPRATROPIUM BROMIDE 0.5 MG: 0.5 SOLUTION RESPIRATORY (INHALATION) at 11:46

## 2017-01-01 RX ADMIN — MEGESTROL ACETATE 200 MG: 40 SUSPENSION ORAL at 10:39

## 2017-01-01 RX ADMIN — ALLOPURINOL 100 MG: 100 TABLET ORAL at 09:10

## 2017-01-01 RX ADMIN — IPRATROPIUM BROMIDE 0.5 MG: 0.5 SOLUTION RESPIRATORY (INHALATION) at 12:23

## 2017-01-01 RX ADMIN — ASPIRIN 81 MG 81 MG: 81 TABLET ORAL at 09:10

## 2017-01-01 RX ADMIN — FUROSEMIDE 20 MG: 10 INJECTION, SOLUTION INTRAVENOUS at 09:31

## 2017-01-01 RX ADMIN — SODIUM CHLORIDE 2040 ML: 900 INJECTION, SOLUTION INTRAVENOUS at 14:27

## 2017-01-01 RX ADMIN — ASPIRIN 81 MG 81 MG: 81 TABLET ORAL at 09:06

## 2017-01-01 RX ADMIN — ASPIRIN 81 MG 81 MG: 81 TABLET ORAL at 12:41

## 2017-01-01 RX ADMIN — AZITHROMYCIN 500 MG: 500 INJECTION, POWDER, LYOPHILIZED, FOR SOLUTION INTRAVENOUS at 12:30

## 2017-01-01 RX ADMIN — CYANOCOBALAMIN TAB 500 MCG 500 MCG: 500 TAB at 09:34

## 2017-01-01 RX ADMIN — DEXTROSE MONOHYDRATE 75 ML/HR: 50 INJECTION, SOLUTION INTRAVENOUS at 12:57

## 2017-01-01 RX ADMIN — MEGESTROL ACETATE 200 MG: 40 SUSPENSION ORAL at 09:07

## 2017-01-01 RX ADMIN — NYSTATIN: 100000 OINTMENT TOPICAL at 20:52

## 2017-01-01 RX ADMIN — SODIUM BICARBONATE 100 ML/HR: 84 INJECTION, SOLUTION INTRAVENOUS at 12:47

## 2017-01-01 RX ADMIN — SODIUM CHLORIDE 75 ML/HR: 900 INJECTION, SOLUTION INTRAVENOUS at 19:56

## 2017-01-01 RX ADMIN — DONEPEZIL HYDROCHLORIDE 10 MG: 10 TABLET ORAL at 09:33

## 2017-01-01 RX ADMIN — MEGESTROL ACETATE 200 MG: 40 SUSPENSION ORAL at 09:31

## 2017-01-01 RX ADMIN — VITAMIN D, TAB 1000IU (100/BT) 1000 UNITS: 25 TAB at 09:32

## 2017-01-01 RX ADMIN — FUROSEMIDE 20 MG: 10 INJECTION, SOLUTION INTRAVENOUS at 09:07

## 2017-01-01 RX ADMIN — SODIUM BICARBONATE 50 MEQ: 84 INJECTION, SOLUTION INTRAVENOUS at 12:05

## 2017-01-01 RX ADMIN — SODIUM CHLORIDE 1000 ML: 9 INJECTION, SOLUTION INTRAVENOUS at 17:42

## 2017-01-01 RX ADMIN — Medication: at 17:29

## 2017-01-01 RX ADMIN — TAZOBACTAM SODIUM AND PIPERACILLIN SODIUM 2.25 G: 250; 2 INJECTION, SOLUTION INTRAVENOUS at 02:00

## 2017-01-01 RX ADMIN — MORPHINE SULFATE 2 MG: 2 INJECTION, SOLUTION INTRAMUSCULAR; INTRAVENOUS at 16:34

## 2017-01-01 RX ADMIN — FUROSEMIDE 20 MG: 10 INJECTION, SOLUTION INTRAVENOUS at 09:09

## 2017-01-01 RX ADMIN — VANCOMYCIN HYDROCHLORIDE 1000 MG: 1 INJECTION, POWDER, LYOPHILIZED, FOR SOLUTION INTRAVENOUS at 15:55

## 2017-01-01 RX ADMIN — IPRATROPIUM BROMIDE 0.5 MG: 0.5 SOLUTION RESPIRATORY (INHALATION) at 11:43

## 2017-01-01 RX ADMIN — CEFTRIAXONE SODIUM 1 G: 1 INJECTION, POWDER, FOR SOLUTION INTRAMUSCULAR; INTRAVENOUS at 12:56

## 2017-01-01 RX ADMIN — SODIUM BICARBONATE 100 ML/HR: 84 INJECTION, SOLUTION INTRAVENOUS at 05:40

## 2017-01-01 RX ADMIN — NYSTATIN: 100000 OINTMENT TOPICAL at 11:23

## 2017-01-01 RX ADMIN — ALBUMIN HUMAN 25 G: 0.05 INJECTION, SOLUTION INTRAVENOUS at 10:33

## 2017-01-01 RX ADMIN — VITAMIN D, TAB 1000IU (100/BT) 1000 UNITS: 25 TAB at 09:06

## 2017-01-01 RX ADMIN — CEFTRIAXONE SODIUM 1 G: 1 INJECTION, POWDER, FOR SOLUTION INTRAMUSCULAR; INTRAVENOUS at 12:26

## 2017-01-01 RX ADMIN — IPRATROPIUM BROMIDE 0.5 MG: 0.5 SOLUTION RESPIRATORY (INHALATION) at 06:57

## 2017-01-01 RX ADMIN — VANCOMYCIN HYDROCHLORIDE 1000 MG: 1 INJECTION, POWDER, LYOPHILIZED, FOR SOLUTION INTRAVENOUS at 14:18

## 2017-01-01 RX ADMIN — ALLOPURINOL 100 MG: 100 TABLET ORAL at 09:31

## 2017-01-01 RX ADMIN — VANCOMYCIN HYDROCHLORIDE 1000 MG: 1 INJECTION, POWDER, LYOPHILIZED, FOR SOLUTION INTRAVENOUS at 16:12

## 2017-01-01 RX ADMIN — IPRATROPIUM BROMIDE 0.5 MG: 0.5 SOLUTION RESPIRATORY (INHALATION) at 07:39

## 2017-01-01 RX ADMIN — SODIUM CHLORIDE 2040 ML: 900 INJECTION, SOLUTION INTRAVENOUS at 15:34

## 2017-02-15 PROBLEM — I50.9 ACUTE ON CHRONIC CONGESTIVE HEART FAILURE (HCC): Status: ACTIVE | Noted: 2017-01-01

## 2017-02-16 NOTE — PLAN OF CARE
Problem: Patient Care Overview (Adult)  Goal: Plan of Care Review  Outcome: Ongoing (interventions implemented as appropriate)    02/16/17 1406   Patient Care Overview   Progress no change   Coping/Psychosocial Response Interventions   Plan Of Care Reviewed With family   Outcome Evaluation   Outcome Summary/Follow up Plan Pt family provides her with puree diet at home. Reminded family not to add salt to pt meals. Will provide glucerna and magic cups to optimize protein/kris intake. Will also honor pt food prefs.

## 2017-02-16 NOTE — H&P
hospitalist  Active Problems:    Acute on chronic congestive heart failure        Subjective: Breathing is better.  No chest pain.  No new complaints.  Family members are at bedside.  Seen along with nurse and .        Review of Systems:    Review of Systems   All other systems reviewed and are negative.      Objective     Vital Signs:  Temp:  [96 °F (35.6 °C)-97.9 °F (36.6 °C)] 97.7 °F (36.5 °C)  Heart Rate:  [41-94] 41  Resp:  [16-33] 16  BP: (139-221)/() 142/90    Physical Exam:   Physical Exam   Constitutional: No distress.   Eyes: Conjunctivae are normal. No scleral icterus.   Neck: Neck supple. No JVD present.   Cardiovascular: An irregular rhythm present. Bradycardia present.    No murmur heard.  Pulmonary/Chest: No respiratory distress. She has no wheezes. She has rales.   Abdominal: Soft. She exhibits no distension. There is no tenderness.   Musculoskeletal: She exhibits no edema.   Neurological: She is alert.   Skin: Skin is warm and dry. No rash noted. She is not diaphoretic.          Results Review:       Lab Results (last 24 hours)     Procedure Component Value Units Date/Time    Procalcitonin [[id number]] Collected:  02/15/17 2107    Specimen:  Blood Updated:  02/15/17 2112    Lactic Acid, Plasma [[id number]]  (Normal) Collected:  02/15/17 2107    Specimen:  Blood Updated:  02/15/17 2129     Lactate 1.1 mmol/L     Comprehensive Metabolic Panel [[id number]]  (Abnormal) Collected:  02/15/17 2107    Specimen:  Blood Updated:  02/15/17 2131     Glucose 145 (H) mg/dL      BUN 26 (H) mg/dL      Creatinine 1.12 (H) mg/dL      Sodium 126 (L) mmol/L      Potassium 4.8 mmol/L      Chloride 92 (L) mmol/L      CO2 26.0 mmol/L      Calcium 9.7 mg/dL      Total Protein 7.2 g/dL      Albumin 3.50 g/dL      ALT (SGPT) 28 U/L      AST (SGOT) 31 U/L      Alkaline Phosphatase 122 U/L      Total Bilirubin 0.3 mg/dL      eGFR   Amer 54 mL/min/1.73      Globulin 3.7 (H) gm/dL      A/G Ratio 0.9 (L) g/dL       BUN/Creatinine Ratio 23.2      Anion Gap 8.0 mmol/L     Narrative:       The MDRD GFR formula is only valid for adults with stable renal function between ages 18 and 70.    Protime-INR [[id number]]  (Normal) Collected:  02/15/17 2107    Specimen:  Blood Updated:  02/15/17 2133     Protime 12.7 Seconds      INR 0.95     Narrative:       Therapeutic range for most indications is 2.0-3.0 INR,  or 2.5-3.5 for mechanical heart valves.    Troponin [[id number]]  (Normal) Collected:  02/15/17 2107    Specimen:  Blood Updated:  02/15/17 2142     Troponin I 0.018 ng/mL     BNP [[id number]]  (Abnormal) Collected:  02/15/17 2107    Specimen:  Blood Updated:  02/15/17 2142     proBNP 2070.0 (H) pg/mL     Blood Culture [[id number]]  (Normal) Collected:  02/15/17 2107    Specimen:  Blood from Arm, Right Updated:  02/16/17 1001     Blood Culture No growth at less than 24 hours     Blood Culture [[id number]]  (Normal) Collected:  02/15/17 2107    Specimen:  Blood from Arm, Left Updated:  02/16/17 1001     Blood Culture No growth at less than 24 hours         Imaging Results (last 24 hours)     Procedure Component Value Units Date/Time    XR Chest 1 View [[id number]] Collected:  02/15/17 2027     Updated:  02/15/17 2032    Narrative:       Patient Name:  CARLOS Marmolejo ID:  [id number] Ordering:   TIFFANIE Bermanending:  TIFFANIE STUARTReferring:  TIFFANIE STUART------------------------------------------------PROCEDURE:  XR CHEST 1 VIEW    INDICATION FOR PROCEDURE:  89 years -old patient presents for  evaluation of chest pain.    COMPARISON:  None    FINDINGS: XR CHEST one view reveals the lungs are underexpanded.  Cardiac monitoring leads are present. There appears to be a  hiatal hernia. The thoracic aorta is tortuous with vascular  calcifications. Cardiac silhouette is mildly enlarged. There may  be small right-sided pleural effusion. There is mild prominence  of bronchovascular markings    There is no radiographic evidence for  airspace consolidation or  pneumothorax. Mediastinal silhouette is within normal limits.     The bones appear osteopenic. There is degenerative arthropathy of  both shoulders.        Impression:       Mild cardiomegaly with pulmonary congestion.     Electronically signed by:  Mariaa Tenorio MD  2/15/2017 8:31 PM [facility]  Workstation: [facility]              Medication Review: medications have been reviewed    Assessment/Plan:  1.  Acute congestive heart failure, probably both systolic and diastolic in nature  -IV diuretics, monitor cardiac enzymes, monitor electrolytes and kidney functions  2.  Hyponatremia  -Monitor sodium  3.  Dementia, advanced  -Continue home medicines in  4.  DNR as per family members, discuss at length  5.  Bradycardia, suggestive of sick sinus syndrome  -No pacemaker as per family members    Probably home in 1-2 days  DVT prophylaxis: Lovenox          Bhargavi Kovacs MD  02/16/17  11:22 AM

## 2017-02-16 NOTE — PLAN OF CARE
Problem: Patient Care Overview (Adult)  Goal: Plan of Care Review  Outcome: Ongoing (interventions implemented as appropriate)    02/16/17 1209   Patient Care Overview   Progress no change   Coping/Psychosocial Response Interventions   Plan Of Care Reviewed With family   Outcome Evaluation   Outcome Summary/Follow up Plan Family refuses diuretic for CHF       Goal: Adult Individualization and Mutuality  Outcome: Ongoing (interventions implemented as appropriate)  Goal: Discharge Needs Assessment  Outcome: Ongoing (interventions implemented as appropriate)    Problem: Cardiac: Heart Failure (Adult)  Goal: Signs and Symptoms of Listed Potential Problems Will be Absent or Manageable (Cardiac: Heart Failure)  Outcome: Ongoing (interventions implemented as appropriate)

## 2017-02-16 NOTE — PROGRESS NOTES
Discharge Planning Assessment  [facility]     Patient Name: Мария López  MRN: [id number]  Today's Date: 2/16/2017    Admit Date: 2/15/2017          Discharge Needs Assessment       02/16/17 1431    Living Environment    Lives With child(hortencia), adult    Living Arrangements house    Provides Primary Care For no one, unable/limited ability to care for self    Primary Care Provided By child(hortencia) (specify);other (see comments)   sitters    Quality Of Family Relationships supportive    Able to Return to Prior Living Arrangements yes    Discharge Needs Assessment    Concerns To Be Addressed no discharge needs identified;adjustment to diagnosis/illness concerns    Anticipated Changes Related to Illness none    Equipment Currently Used at Home wheelchair;walker, standard;commode   hospital bed    Transportation Available car    Discharge Disposition home or self-care            Discharge Plan       02/16/17 1435    Case Management/Social Work Plan    Plan home no services    Patient/Family In Agreement With Plan yes    Additional Comments LSW assesment complete. pt resides at home and has assistance 24/7. family goal is for pt to return home at d/c, they voiced no needs at this time. LSW/case mgt will follow up as consulted.        Discharge Placement     No information found                Demographic Summary       02/16/17 1427    Referral Information    Referral Source physician    Reason For Consult discharge planning    Record Reviewed medical record    Primary Care Physician Information    Name dr brooks            Functional Status       02/16/17 1428    Functional Status Prior    Ambulation 3-->assistive equipment and person    Transferring 3-->assistive equipment and person    Toileting 3-->assistive equipment and person    Bathing 3-->assistive equipment and person    Dressing 2-->assistive person    Eating 2-->assistive person    Communication 0-->understands/communicates without difficulty    Swallowing  0-->swallows foods/liquids without difficulty    IADL    Medications assistive equipment;assistive person    Meal Preparation assistive person    Housekeeping assistive person    Laundry assistive person    Shopping assistive person    Oral Care assistive person    Activity Tolerance    Current Activity Limitations none    Usual Activity Tolerance poor    Current Activity Tolerance poor    Cognitive/Perceptual/Developmental    Current Mental Status/Cognitive Functioning unable to assess    Recent Changes in Mental Status/Cognitive Functioning unable to assess    Developmental Stage (Eriksson's Stages of Development) Stage 8 (65 years-death/Late Adulthood) Integrity vs. Despair    Employment/Financial    Source Of Income pension/residential    Financial Concerns none            Psychosocial     None            Abuse/Neglect     None            Legal     None            Substance Abuse     None            Patient Forms     None          MAXX Wood

## 2017-02-16 NOTE — ED PROVIDER NOTES
Subjective   Patient is a 89 y.o. female presenting with shortness of breath.   Shortness of Breath   Severity:  Moderate  Onset quality:  Gradual  Duration:  1 day  Timing:  Constant  Progression:  Worsening  Chronicity:  Recurrent  Context: not activity, not animal exposure, not emotional upset, not fumes, not known allergens, not occupational exposure, not pollens, not smoke exposure, not strong odors, not URI and not weather changes    Relieved by:  Nothing  Exacerbated by: supine position.  Ineffective treatments:  None tried  Associated symptoms: cough (nonproductive)    Associated symptoms: no abdominal pain, no chest pain, no diaphoresis, no fever, no hemoptysis, no sputum production and no wheezing    Cough:     Cough characteristics:  Non-productive    Severity:  Moderate    Onset quality:  Gradual    Duration:  1 day    Timing:  Intermittent    Progression:  Worsening    Chronicity:  Recurrent  Risk factors: no recent alcohol use, no obesity and no tobacco use        Review of Systems   Constitutional: Negative.  Negative for diaphoresis and fever.   HENT: Negative.    Eyes: Negative.    Respiratory: Positive for cough (nonproductive) and shortness of breath. Negative for hemoptysis, sputum production and wheezing.    Cardiovascular: Negative.  Negative for chest pain.   Gastrointestinal: Negative.  Negative for abdominal pain.   Musculoskeletal: Negative.    Skin: Negative.    Neurological: Negative.        Past Medical History   Diagnosis Date   • Age-related physical debility    • Anxiety disorder    • Atopic dermatitis and related condition    • Benign essential hypertension    • Benign hypertension    • Borderline glaucoma    • Chronic kidney disease    • Cobalamin deficiency    • Cortical senile cataract    • Diabetes mellitus    • Diabetes mellitus without complication    • Diabetic polyneuropathy    • Edema      But stable   • Essential hypertension    • Gastroesophageal reflux disease    • Gout     • Hallux valgus, acquired, bilateral    • Hand joint pain    • Malaise and fatigue    • Memory impairment    • Memory loss    • Need for prophylactic vaccination and inoculation against influenza    • Neurologic disorder associated with diabetes mellitus    • Nuclear cataract    • Onychogryposis    • Pain in lower limb    • Peripheral venous insufficiency    • Renal failure syndrome    • Senile dementia    • Sick sinus syndrome    • Swelling of limb      Right hand and forearm      • Type 2 diabetes mellitus        Allergies   Allergen Reactions   • Albuterol    • Univasc [Moexipril]        Past Surgical History   Procedure Laterality Date   • Appendectomy  07/21/1970     incidental   • Central venous line insertion  04/05/2016     Successful placement of right upper extremity 6-Sami triple lumen PICC line.   • Finger nail surgery       Debride nail 6 or more x9   • Injection of medication       Kenalog x2   • Total abdominal hysterectomy with salpingo oophorectomy  07/21/1970     uterine myomata       Family History   Problem Relation Age of Onset   • Coronary artery disease Other    • Cancer Other    • Diabetes Other    • Hypertension Other        Social History     Social History   • Marital status:      Spouse name: N/A   • Number of children: N/A   • Years of education: N/A     Social History Main Topics   • Smoking status: Never Smoker   • Smokeless tobacco: None   • Alcohol use No   • Drug use: None   • Sexual activity: Not Asked     Other Topics Concern   • None     Social History Narrative           Objective   Physical Exam   Constitutional: She is oriented to person, place, and time. She appears well-developed and well-nourished.   HENT:   Head: Normocephalic and atraumatic.   Mouth/Throat: Oropharynx is clear and moist.   Eyes: Conjunctivae and EOM are normal. Pupils are equal, round, and reactive to light.   Neck: Normal range of motion. Neck supple.   Cardiovascular: Normal rate, regular  rhythm and normal heart sounds.  Exam reveals no gallop and no friction rub.    No murmur heard.  Pulmonary/Chest: Effort normal. No respiratory distress. She has no wheezes. She has rhonchi in the right upper field, the right middle field, the right lower field, the left upper field, the left middle field and the left lower field. She has rales. She exhibits no tenderness.   Abdominal: Soft. Bowel sounds are normal. She exhibits no mass. There is no tenderness. There is no rebound and no guarding.   Musculoskeletal: Normal range of motion. She exhibits no tenderness.   Neurological: She is alert and oriented to person, place, and time. She has normal reflexes. No cranial nerve deficit.   Skin: Skin is warm and dry.   Nursing note and vitals reviewed.      Procedures         ED Course  ED Course      Labs Reviewed   COMPREHENSIVE METABOLIC PANEL - Abnormal; Notable for the following:        Result Value    Glucose 145 (*)     BUN 26 (*)     Creatinine 1.12 (*)     Sodium 126 (*)     Chloride 92 (*)     Globulin 3.7 (*)     A/G Ratio 0.9 (*)     All other components within normal limits    Narrative:     The MDRD GFR formula is only valid for adults with stable renal function between ages 18 and 70.   BNP (IN-HOUSE) - Abnormal; Notable for the following:     proBNP 2070.0 (*)     All other components within normal limits   PROCALCITONIN - Abnormal; Notable for the following:     Procalcitonin 0.10 (*)     All other components within normal limits    Narrative:     Performed at:  [address] [facility]  [id number]  : Adrián Garza MD, Phone:  [phone]   BASIC METABOLIC PANEL - Abnormal; Notable for the following:     Glucose 101 (*)     BUN 39 (*)     Creatinine 1.10 (*)     Sodium 129 (*)     BUN/Creatinine Ratio 35.5 (*)     All other components within normal limits    Narrative:     The MDRD GFR formula is only valid for adults with stable renal function between  ages 18 and 70.   CBC WITH AUTO DIFFERENTIAL - Abnormal; Notable for the following:     RBC 3.11 (*)     Hemoglobin 9.8 (*)     Hematocrit 27.7 (*)     All other components within normal limits   BASIC METABOLIC PANEL - Abnormal; Notable for the following:     Glucose 156 (*)     BUN 49 (*)     Creatinine 1.30 (*)     Sodium 132 (*)     BUN/Creatinine Ratio 37.7 (*)     All other components within normal limits    Narrative:     The MDRD GFR formula is only valid for adults with stable renal function between ages 18 and 70.   BLOOD CULTURE - Normal   BLOOD CULTURE - Normal   TROPONIN (IN-HOUSE) - Normal   PROTIME-INR - Normal    Narrative:     Therapeutic range for most indications is 2.0-3.0 INR,  or 2.5-3.5 for mechanical heart valves.   LACTIC ACID, PLASMA - Normal   CBC AND DIFFERENTIAL    Narrative:     The following orders were created for panel order CBC & Differential.  Procedure                               Abnormality         Status                     ---------                               -----------         ------                     CBC Auto Differential[[id number]]         Abnormal            Final result                 Please view results for these tests on the individual orders.     Xr Chest 1 View    Result Date: 2/15/2017  Narrative: Patient Name:  CARLOS Marmolejo ID:  [id number] Ordering: TIFFANIE STUARTAttending:  TIFFANIE STUARTReferring:  TIFFANIE STAURT------------------------------------------------PROCEDURE: XR CHEST 1 VIEW INDICATION FOR PROCEDURE:  89 years -old patient presents for evaluation of chest pain. COMPARISON:  None FINDINGS: XR CHEST one view reveals the lungs are underexpanded. Cardiac monitoring leads are present. There appears to be a hiatal hernia. The thoracic aorta is tortuous with vascular calcifications. Cardiac silhouette is mildly enlarged. There may be small right-sided pleural effusion. There is mild prominence of bronchovascular markings There is no radiographic  evidence for airspace consolidation or pneumothorax. Mediastinal silhouette is within normal limits. The bones appear osteopenic. There is degenerative arthropathy of both shoulders.      Impression: Mild cardiomegaly with pulmonary congestion. Electronically signed by:  Mariaa Tenorio MD  2/15/2017 8:31 PM CST Workstation: [facility]    Will admit to Dr Villafuerte for CHF and diuresis        MDM    Final diagnoses:   Acute on chronic congestive heart failure, unspecified congestive heart failure type            Juan Miguel Barrientos MD  03/09/17 4581

## 2017-02-16 NOTE — CONSULTS
"Adult Nutrition  Assessment    Patient Name:  Мария López  YOB: 1918  MRN: [id number]  Admit Date:  2/15/2017    Assessment Date:  2/16/2017          Reason for Assessment       02/16/17 1349    Reason for Assessment    Reason For Assessment/Visit identified at risk by screening criteria    Identified At Risk By Screening Criteria dysphagia or difficulty swallowing;failure to thrive   geriatric                Nutrition/Diet History       02/16/17 1350    Nutrition/Diet History    Typical Food/Fluid Intake Family reports pt usually has a good appetite and that they order pre-made puree foods for her from [employer]    Food Preferences Likes mashed potatoes and gravy    Supplemental Drinks/Foods/Additives Likes ensure and icecream            Anthropometrics       02/16/17 1351    Anthropometrics    Height 152.4 cm (60\")    Weight 55.8 kg (123 lb)    RD Documented Weight on Admission 63.5 kg (140 lb)    Anthropometrics (Special Considerations)    RD Calculated     RD Calculated %     Ideal Body Weight (IBW)    Ideal Body Weight (IBW), Female 46.26    % Ideal Body Weight 120.86    Body Mass Index (BMI)    BMI (kg/m2) 24.07    BMI Grade 19.1 - 24.9 - normal            Labs/Tests/Procedures/Meds       02/16/17 1353    Labs/Tests/Procedures/Meds    Labs/Tests Review Reviewed    Medication Review Reviewed, pertinent;Diuretic;Other (comment)   Megace              Estimated/Assessed Needs       02/16/17 1354    Calculation Measurements    Weight Used For Calculations 55.8 kg (123 lb)    Height Used for Calculations 1.524 m (5')    Estimated/Assessed Energy Needs    Energy Need Method [place]-St Jeor;Kcal/kg    kcal/kg 25    25 Kcal/Kg (kcal) 1394.8    Age 89    RMR ([place]-St. Jeor Equation) 859.42    Activity Factors ([place] St Jeor)  Out of bed, ambulatory  1.3    Total estimated needs ([place] St. Jeor) 1116    Estimated Kcal Range  6691-8158    Estimated/Assessed Protein Needs    Weight " Used for Protein Calculation 55.8 kg (123 lb)    Protein (gm/kg) 0.8    0.8 Gm Protein (gm) 44.63    Estimated Protein Range 44-56    Estimated/Assessed Fluid Needs    Fluid Need Method RDA method    RDA Method (mL)  1300            Nutrition Prescription Ordered       02/16/17 1356    Nutrition Prescription PO    Current PO Diet Pureed    Common Modifiers Cardiac            Evaluation of Received Nutrient/Fluid Intake       02/16/17 1356    PO Evaluation    Number of Days PO Intake Evaluated 1 day    Number of Meals 1    % PO Intake 75            Comments:      Pt sleeping but family present at bedside reports she has eaten well at both meals today and normally has a good appetite.  No reported wt changes.  Wt down from admit, possibly fluid related w/lasix prescribed and dx CHF.  Pt family prepares meals for her at home by getting pre-packaged puree foods delivered from [employer].  RD reminded family not to add salt to pt foods.  Family voiced some pt food prefs and stated that she likes Ensure.  RD will add glucerna to optimize protein/kris while maintaining glucose control.  No GI issues reported.  RD will follow.        Electronically signed by:  Melanie Mathews RD  02/16/17 1:56 PM

## 2017-02-16 NOTE — ED NOTES
Pt arrived via ems with acute respiratory distress. Audible rhonchi noted.      Peggy Moe RN  02/15/17 2031

## 2017-02-16 NOTE — H&P
[facility] Medicine Admission      Date of Admission: 2/15/2017      Primary Care Physician: Yolette Egan MD    Following information is obtained partially from patient, family and/or medical records.    Chief Complaint:   Chief Complaint   Patient presents with   • Shortness of Breath       HPI: Pt is a 89 y.o. female presenting to the ER with known history of congestive heart failure.  According to family patient has been progressively having gurgling noises and sounding congested over the last 3-4 days.  Patient also had some cough.  History is limited as patient has dementia and does not communicate sufficiently.       Concurrent Medical History:   Patient Active Problem List   Diagnosis   • Type 2 diabetes mellitus   • Sick sinus syndrome   • Senile dementia   • Renal failure syndrome   • Peripheral venous insufficiency   • Essential hypertension   • Gastroesophageal reflux disease   • Gout   • Diabetic polyneuropathy   • Cortical senile cataract   • Cobalamin deficiency   • Acute on chronic congestive heart failure       Past Surgical History:   Past Surgical History   Procedure Laterality Date   • Appendectomy  07/21/1970     incidental   • Central venous line insertion  04/05/2016     Successful placement of right upper extremity 6-[place]ic triple lumen PICC line.   • Finger nail surgery       Debride nail 6 or more x9   • Injection of medication       Kenalog x2   • Total abdominal hysterectomy with salpingo oophorectomy  07/21/1970     uterine myomata       Family History: family history includes Cancer in her other; Coronary artery disease in her other; Diabetes in her other; Hypertension in her other.    Social History:  reports that she has never smoked. She does not have any smokeless tobacco history on file. She reports that she does not drink alcohol.    Allergies:   Allergies   Allergen Reactions   • Albuterol    • Univasc [Moexipril]        Home  Medications:   Prior to Admission medications    Medication Sig Start Date End Date Taking? Authorizing Provider   allopurinol (ZYLOPRIM) 100 MG tablet TAKE 1 TABLET BY MOUTH EVERY DAY FOR GOUT 10/24/16  Yes Yolette Egan MD   aspirin (ASPIRIN LOW DOSE) 81 MG tablet Take 81 mg by mouth daily.   Yes Historical Provider, MD   CHOLECALCIFEROL PO Take 1 tablet by mouth 2 (two) times a day.   Yes Historical Provider, MD   donepezil (ARICEPT) 10 MG tablet Take 10 mg by mouth daily.   Yes Historical Provider, MD   hydrocortisone-zinc oxide-bacitracin-nystatin cream Apply 1 application topically 3 (Three) Times a Day. 1/30/17  Yes Yolette Egan MD   latanoprost (XALATAN) 0.005 % ophthalmic solution Apply 1 drop to eye at night as needed.   Yes Historical Provider, MD   losartan (COZAAR) 25 MG tablet TAKE 1/2 TABLET BY MOUTH DAILY AS NEEDED IF SYSTOLIC BLOOD PRESSURE OVER 150 1/9/17  Yes Yolette Egan MD   megestrol (MEGACE) 40 MG/ML suspension TAKE 5 ML BY MOUTH 2 TIMES PER DAY 2/10/17  Yes Yolette Egan MD   Multiple Vitamins-Minerals (MULTIVITAMIN PO) Take 1 tablet by mouth daily.   Yes Historical Provider, MD   nystatin (MYCOSTATIN) 697812 UNIT/GM ointment  9/28/16  Yes Historical Provider, MD   ofloxacin (OCUFLOX) 0.3 % ophthalmic solution Administer 1 drop into the left eye 4 (Four) Times a Day. 10/19/16  Yes Yolette Egan MD   ranitidine (ZANTAC) 75 MG tablet Take 75 mg by mouth 2 (Two) Times a Day As Needed.   Yes Historical Provider, MD   Unable to find Med Name: oxyquinoline-emollient   Dose: 1 application  Route: Topical  Frequency: Daily   Yes Historical Provider, MD   vitamin B-12 (CYANOCOBALAMIN) 1000 MCG tablet Take 500 mcg by mouth Daily.   Yes Historical Provider, MD       Review of Systems:  Review of Systems   Unable to perform ROS: Dementia      Otherwise complete ROS is negative except as mentioned above and in HPI.    Physical Exam:   Temp:  [96 °F (35.6 °C)-97.9 °F (36.6 °C)] 96 °F  (35.6 °C)  Heart Rate:  [66-94] 87  Resp:  [16-33] 16  BP: (139-221)/() 168/88  Physical Exam   Constitutional: She is oriented to person, place, and time. She appears well-developed and well-nourished.   HENT:   Head: Normocephalic and atraumatic.   Nose: Nose normal.   Mouth/Throat: Oropharynx is clear and moist.   Eyes: Conjunctivae are normal. Pupils are equal, round, and reactive to light. No scleral icterus.   Neck: No tracheal deviation present.   Cardiovascular: Normal heart sounds.  Exam reveals no friction rub.    Pulmonary/Chest: Effort normal. No respiratory distress. She has no wheezes. She has rales.   Upper airway noise noted, patient does not follow instructions to cough and clear airway.  Some scattered rales noted   Abdominal: Soft. Bowel sounds are normal. She exhibits no distension. There is no tenderness.   Musculoskeletal: Normal range of motion. She exhibits edema.   Neurological: She is alert and oriented to person, place, and time.   Skin: Skin is warm and dry.         Results Reviewed:  I have personally reviewed current lab, radiology, and data and agree with results.  Lab Results (last 24 hours)     Procedure Component Value Units Date/Time    Procalcitonin [[id number]] Collected:  02/15/17 2107    Specimen:  Blood Updated:  02/15/17 2112    Blood Culture [[id number]] Collected:  02/15/17 2107    Specimen:  Blood from Arm, Right Updated:  02/15/17 2112    Blood Culture [[id number]] Collected:  02/15/17 2107    Specimen:  Blood from Arm, Left Updated:  02/15/17 2113    Lactic Acid, Plasma [[id number]]  (Normal) Collected:  02/15/17 2107    Specimen:  Blood Updated:  02/15/17 2129     Lactate 1.1 mmol/L     Comprehensive Metabolic Panel [[id number]]  (Abnormal) Collected:  02/15/17 2107    Specimen:  Blood Updated:  02/15/17 2131     Glucose 145 (H) mg/dL      BUN 26 (H) mg/dL      Creatinine 1.12 (H) mg/dL      Sodium 126 (L) mmol/L      Potassium 4.8 mmol/L      Chloride 92 (L) mmol/L       CO2 26.0 mmol/L      Calcium 9.7 mg/dL      Total Protein 7.2 g/dL      Albumin 3.50 g/dL      ALT (SGPT) 28 U/L      AST (SGOT) 31 U/L      Alkaline Phosphatase 122 U/L      Total Bilirubin 0.3 mg/dL      eGFR  Fawad Amer 54 mL/min/1.73      Globulin 3.7 (H) gm/dL      A/G Ratio 0.9 (L) g/dL      BUN/Creatinine Ratio 23.2      Anion Gap 8.0 mmol/L     Narrative:       The MDRD GFR formula is only valid for adults with stable renal function between ages 18 and 70.    Protime-INR [[id number]]  (Normal) Collected:  02/15/17 2107    Specimen:  Blood Updated:  02/15/17 2133     Protime 12.7 Seconds      INR 0.95     Narrative:       Therapeutic range for most indications is 2.0-3.0 INR,  or 2.5-3.5 for mechanical heart valves.    Troponin [[id number]]  (Normal) Collected:  02/15/17 2107    Specimen:  Blood Updated:  02/15/17 2142     Troponin I 0.018 ng/mL     BNP [[id number]]  (Abnormal) Collected:  02/15/17 2107    Specimen:  Blood Updated:  02/15/17 2142     proBNP 2070.0 (H) pg/mL        family declined blood draw for a CBC    Imaging Results (last 24 hours)     Procedure Component Value Units Date/Time    XR Chest 1 View [[id number]] Collected:  02/15/17 2027     Updated:  02/15/17 2032    Narrative:       Patient Name:  CARLOS Marmolejo ID:  [id number] Ordering:   TIFFANIE STUARTAttending:  TIFFANIE STUARTReferring:  TIFFANIE STUART------------------------------------------------PROCEDURE:  XR CHEST 1 VIEW    INDICATION FOR PROCEDURE:  89 years -old patient presents for  evaluation of chest pain.    COMPARISON:  None    FINDINGS: XR CHEST one view reveals the lungs are underexpanded.  Cardiac monitoring leads are present. There appears to be a  hiatal hernia. The thoracic aorta is tortuous with vascular  calcifications. Cardiac silhouette is mildly enlarged. There may  be small right-sided pleural effusion. There is mild prominence  of bronchovascular markings    There is no radiographic evidence for airspace  consolidation or  pneumothorax. Mediastinal silhouette is within normal limits.     The bones appear osteopenic. There is degenerative arthropathy of  both shoulders.        Impression:       Mild cardiomegaly with pulmonary congestion.     Electronically signed by:  Mariaa Tenorio MD  2/15/2017 8:31 PM [facility]  Workstation: [email or web address]              Assessment and Plan:  1.  Mild acute on chronic diastolic dysfunction: Last echo done in April 2016.  We'll repeat echo.  Will place patient on mild diuretic.  2.  Hyponatremia: Fluid restriction.      Jesus Villafuerte MD  02/16/17  4:21 AM

## 2017-02-17 NOTE — PLAN OF CARE
Problem: Patient Care Overview (Adult)  Goal: Plan of Care Review  Outcome: Ongoing (interventions implemented as appropriate)    02/16/17 1406 02/16/17 2053 02/17/17 0343   Patient Care Overview   Progress no change --  --    Coping/Psychosocial Response Interventions   Plan Of Care Reviewed With --  family --    Outcome Evaluation   Outcome Summary/Follow up Plan --  --  Pt slept well through the night. Family stayed at bedside.        Goal: Adult Individualization and Mutuality  Outcome: Ongoing (interventions implemented as appropriate)  Goal: Discharge Needs Assessment  Outcome: Ongoing (interventions implemented as appropriate)    Problem: Cardiac: Heart Failure (Adult)  Goal: Signs and Symptoms of Listed Potential Problems Will be Absent or Manageable (Cardiac: Heart Failure)  Outcome: Ongoing (interventions implemented as appropriate)

## 2017-02-17 NOTE — PLAN OF CARE
Problem: Patient Care Overview (Adult)  Goal: Adult Individualization and Mutuality  Outcome: Ongoing (interventions implemented as appropriate)  Goal: Discharge Needs Assessment  Outcome: Ongoing (interventions implemented as appropriate)    Problem: Cardiac: Heart Failure (Adult)  Goal: Signs and Symptoms of Listed Potential Problems Will be Absent or Manageable (Cardiac: Heart Failure)  Outcome: Ongoing (interventions implemented as appropriate)

## 2017-02-17 NOTE — H&P
hospitalist  Active Problems:    Acute on chronic congestive heart failure        Subjective: Breathing is better.  But having a lot of congestions and secretions.  Does mouth breathing but not unusual according to son.  No chest pain.  No new complaints.  Family members are at bedside.  Seen along with nurse and .        Review of Systems:    Review of Systems   All other systems reviewed and are negative.      Objective     Vital Signs:  Temp:  [96.5 °F (35.8 °C)-97.6 °F (36.4 °C)] 97.6 °F (36.4 °C)  Heart Rate:  [56-80] 80  Resp:  [16-18] 18  BP: (124-178)/(68-92) 178/84    Physical Exam:   Physical Exam   Constitutional: No distress.   Eyes: Conjunctivae are normal. No scleral icterus.   Neck: Neck supple. No JVD present.   Cardiovascular: An irregular rhythm present. Bradycardia present.    No murmur heard.  Pulmonary/Chest: No respiratory distress. She has no wheezes. She has rales.    throat secretions with congestive sounds noted in both lungs.   Abdominal: Soft. She exhibits no distension. There is no tenderness.   Musculoskeletal: She exhibits no edema.   Neurological: She is alert.   Skin: Skin is warm and dry. No rash noted. She is not diaphoretic.          Results Review:       Lab Results (last 24 hours)     Procedure Component Value Units Date/Time    Blood Culture [[id number]]  (Normal) Collected:  02/15/17 2107    Specimen:  Blood from Arm, Right Updated:  02/16/17 2201     Blood Culture No growth at 24 hours     Blood Culture [[id number]]  (Normal) Collected:  02/15/17 2107    Specimen:  Blood from Arm, Left Updated:  02/16/17 2201     Blood Culture No growth at 24 hours     CBC Auto Differential [[id number]]  (Abnormal) Collected:  02/17/17 0532    Specimen:  Blood Updated:  02/17/17 0645     WBC 7.77 10*3/mm3      RBC 3.11 (L) 10*6/mm3      Hemoglobin 9.8 (L) g/dL      Hematocrit 27.7 (L) %      MCV 89.1 fL      MCH 31.5 pg      MCHC 35.4 g/dL      RDW 13.9 %      RDW-SD 45.4 fl      MPV  9.6 fL      Platelets 278 10*3/mm3      Neutrophil % 60.3 %      Lymphocyte % 30.5 %      Monocyte % 8.6 %      Eosinophil % 0.4 %      Basophil % 0.1 %      Immature Grans % 0.1 %      Neutrophils, Absolute 4.68 10*3/mm3      Lymphocytes, Absolute 2.37 10*3/mm3      Monocytes, Absolute 0.67 10*3/mm3      Eosinophils, Absolute 0.03 10*3/mm3      Basophils, Absolute 0.01 10*3/mm3      Immature Grans, Absolute 0.01 10*3/mm3     CBC & Differential [[id number]] Collected:  02/17/17 0532    Specimen:  Blood Updated:  02/17/17 0645    Narrative:       The following orders were created for panel order CBC & Differential.  Procedure                               Abnormality         Status                     ---------                               -----------         ------                     CBC Auto Differential[[id number]]         Abnormal            Final result                 Please view results for these tests on the individual orders.    Basic Metabolic Panel [[id number]]  (Abnormal) Collected:  02/17/17 0532    Specimen:  Blood Updated:  02/17/17 0715     Glucose 101 (H) mg/dL      BUN 39 (H) mg/dL      Creatinine 1.10 (H) mg/dL      Sodium 129 (L) mmol/L      Potassium 4.8 mmol/L      Chloride 95 mmol/L      CO2 29.0 mmol/L      Calcium 9.0 mg/dL      eGFR   Amer 56 mL/min/1.73      BUN/Creatinine Ratio 35.5 (H)      Anion Gap 5.0 mmol/L     Narrative:       The MDRD GFR formula is only valid for adults with stable renal function between ages 18 and 70.        Imaging Results (last 24 hours)     Procedure Component Value Units Date/Time    XR Chest 1 View [[id number]] Collected:  02/15/17 2027     Updated:  02/15/17 2032    Narrative:       Patient Name:  CARLOS Marmolejo ID:  [id number] Ordering:   TIFFANIE STUARTAttending:  TIFFANIE STUARTReferring:  TIFFANIE STUART------------------------------------------------PROCEDURE:  XR CHEST 1 VIEW    INDICATION FOR PROCEDURE:  89 years -old patient presents  for  evaluation of chest pain.    COMPARISON:  None    FINDINGS: XR CHEST one view reveals the lungs are underexpanded.  Cardiac monitoring leads are present. There appears to be a  hiatal hernia. The thoracic aorta is tortuous with vascular  calcifications. Cardiac silhouette is mildly enlarged. There may  be small right-sided pleural effusion. There is mild prominence  of bronchovascular markings    There is no radiographic evidence for airspace consolidation or  pneumothorax. Mediastinal silhouette is within normal limits.     The bones appear osteopenic. There is degenerative arthropathy of  both shoulders.        Impression:       Mild cardiomegaly with pulmonary congestion.     Electronically signed by:  Mariaa Tenorio MD  2/15/2017 8:31 PM CST  Workstation: [email or web address]              Medication Review: medications have been reviewed    Assessment/Plan:  1.  Acute congestive heart failure, probably both systolic and diastolic in nature  -IV diuretics, monitor cardiac enzymes are negative, monitor electrolytes and kidney functions  2.  Hyponatremia, little better  -Monitor sodium  3.  Dementia, advanced  -Continue home medicines in  4.  DNR as per family members, discuss at length  5.  Bradycardia, suggestive of sick sinus syndrome  -No pacemaker as per family members  -Better today  6.  Chronic kidney disease, little worse probably due to diuretics  7.  Throat secretions with congestive lung sounds  -Start Atrovent inhaler, albuterol is listed in allergies    Probably home tomorrow  DVT prophylaxis: Lovenox          Bhargavi Kovacs MD  02/17/17  11:49 AM

## 2017-02-18 NOTE — PLAN OF CARE
Problem: Patient Care Overview (Adult)  Goal: Plan of Care Review  Outcome: Ongoing (interventions implemented as appropriate)    02/18/17 0535   Patient Care Overview   Progress no change   Coping/Psychosocial Response Interventions   Plan Of Care Reviewed With daughter   Outcome Evaluation   Outcome Summary/Follow up Plan Pt slep throughout the night. Daughter at bedisde        Goal: Adult Individualization and Mutuality  Outcome: Ongoing (interventions implemented as appropriate)  Goal: Discharge Needs Assessment  Outcome: Ongoing (interventions implemented as appropriate)    Problem: Cardiac: Heart Failure (Adult)  Goal: Signs and Symptoms of Listed Potential Problems Will be Absent or Manageable (Cardiac: Heart Failure)  Outcome: Ongoing (interventions implemented as appropriate)

## 2017-02-18 NOTE — NURSING NOTE
Paged Dr. Villafuerte per family request to discuss pt care regarding meds that was supposed to be discontinued prior to my shift. Family also wanted to discuss getting pt something for congestion. When Dr. Villafuerte came up to discuss the issues with family, they were very disrespectful and impolite. Family wanted to know why nothing had been ordered for congestion to help pt cough up sercretions.

## 2017-02-18 NOTE — PROGRESS NOTES
hospitalist  Active Problems:    Acute on chronic congestive heart failure        Subjective: Breathing is better.  But continued to have some congestions and secretions.    No chest pain.  No new complaints.  Daughter is at bedside.  Seen along with nurse and respiratory therapist.  Eating fair.  Family had a lot of questions early this morning and nocturia and is hospitalist Dr. Naylor was called into answered all of the questions.  He did spend a lot of time with the family members at least 15-20 minutes or more explaining and answering some of the questions.      Review of Systems:    Review of Systems   All other systems reviewed and are negative.      Objective     Vital Signs:  Temp:  [96.4 °F (35.8 °C)-98.7 °F (37.1 °C)] 96.5 °F (35.8 °C)  Heart Rate:  [58-97] 58  Resp:  [16-18] 18  BP: (142-148)/(60-77) 142/60    Physical Exam:   Physical Exam   Constitutional: No distress.   Eyes: Conjunctivae are normal. No scleral icterus.   Neck: Neck supple. No JVD present.   Cardiovascular: An irregular rhythm present. Bradycardia present.    No murmur heard.  Pulmonary/Chest: No respiratory distress. She has no wheezes. She has few rales but much better.    throat secretions with congestive sounds noted in both lungs but better than yesterday.   Abdominal: Soft. She exhibits no distension. There is no tenderness.   Musculoskeletal: She exhibits no edema.   Neurological: She is alert.   Skin: Skin is warm and dry. No rash noted. She is not diaphoretic.          Results Review:    Lab Results (last 24 hours)     Procedure Component Value Units Date/Time    Procalcitonin [[id number]]  (Abnormal) Collected:  02/15/17 2107    Specimen:  Blood Updated:  02/17/17 1315     Procalcitonin 0.10 (H) ng/mL       The change of PCT concentration over time provides  prognostic information about the risk of mortality within  28 days for patients diagnosed with severe sepsis or septic  shock coming from the emergency department, ICU,  other  medical wards, or directly from outside the hospital. Data  supports the use of PCT determinations from the day severe  sepsis or septic shock is first diagnosed (Day 0) or the day  thereafter (Day 1) and the fourth day after diagnosis  (Day 4) for the classification of patients into higher and  lower risk for mortality within 28 days according to the  workflow below:                PCT Day 0(or Day 1) - PCT Day 4  delta PCT  = ________________________________  X 100%                     PCT Day 0 (or Day 1)  A decrease of PCT levels below or equal to 80% defines a  positive delta PCT test result representing a higher  risk for 28-day all-cause mortality of patients diagnosed  with severe sepsis or septic shock.  A decreased of PCT levels of more than 80% defines a  negative delta PCT result representing a lower risk for  28-day all-cause mortality of patients diagnosed with  severe sepsis or septic shock.  To determine delta PCT results from the absolute PCT  concentrations of a patient obtained on the day severe  sepsis or septic shock was first diagnosed (or 24 hours  later) and on Day 4, go to [email or web address].                **Please note reference interval change**       Narrative:       Performed at:  [address]  [id number]  : Adrián Garza MD, Phone:  [phone]    Blood Culture [[id number]]  (Normal) Collected:  02/15/17 2107    Specimen:  Blood from Arm, Right Updated:  02/17/17 2202     Blood Culture No growth at 2 days     Blood Culture [[id number]]  (Normal) Collected:  02/15/17 2107    Specimen:  Blood from Arm, Left Updated:  02/17/17 2202     Blood Culture No growth at 2 days            Performed at:  [address] [facility]  [id number]  : Adrián Garza MD, Phone:  [phone]    Blood Culture [[id number]]  (Normal) Collected:  02/15/17 2107    Specimen:  Blood from Arm, Right  Updated:  02/17/17 2202     Blood Culture No growth at 2 days     Blood Culture [[id number]]  (Normal) Collected:  02/15/17 2107    Specimen:  Blood from Arm, Left Updated:  02/17/17 2202     Blood Culture No growth at 2 days               Medication Review: medications have been reviewed    Assessment/Plan:  1.  Acute congestive heart failure, probably both systolic and diastolic in nature, with hypoxia.  Hypoxia improved.  -IV diuretics, monitor cardiac enzymes are negative, monitor electrolytes and kidney functions  -Family has refused for echocardiogram  -Oxygen saturation more than 90% on room air.  2.  Hyponatremia, little better  -Monitor sodium  3.  Dementia, advanced  -Continue home medicines   4.  DNR as per family members, discuss at length  5.  Bradycardia, suggestive of sick sinus syndrome  -No pacemaker as per family members  -Better   6.  Chronic kidney disease, little worse probably due to diuretics  7.  Throat secretions with congestive lung sounds  -Started Atrovent inhaler on 2/17/17, albuterol is listed in allergies  -Put on scopolamine patch    Patient is medically stable and ready to be discharged today.  Maximum benefit of hospitalization has been achieved.  Daughter does not agree with about disease in.  We will have case management to come and see patient and discuss with family members.  Also case management to see for home health on discharge.  Total time spent more than 35 minutes.  DVT prophylaxis: Lovenox

## 2017-02-18 NOTE — PLAN OF CARE
Problem: Fall Risk (Adult)  Goal: Identify Related Risk Factors and Signs and Symptoms  Outcome: Outcome(s) achieved Date Met:  02/18/17 02/18/17 0538   Fall Risk   Fall Risk: Related Risk Factors age-related changes;bladder function altered;confusion/agitation;gait/mobility problems;fatigue/slow reaction;impaired vision;environment unfamiliar       Goal: Absence of Falls  Outcome: Ongoing (interventions implemented as appropriate)    Problem: Pressure Ulcer Risk (Jens Scale) (Adult,Obstetrics,Pediatric)  Goal: Identify Related Risk Factors and Signs and Symptoms  Outcome: Outcome(s) achieved Date Met:  02/18/17  Goal: Skin Integrity  Outcome: Ongoing (interventions implemented as appropriate)

## 2017-02-18 NOTE — PLAN OF CARE
Problem: Patient Care Overview (Adult)  Goal: Adult Individualization and Mutuality  Outcome: Ongoing (interventions implemented as appropriate)  Goal: Discharge Needs Assessment  Outcome: Ongoing (interventions implemented as appropriate)    Problem: Cardiac: Heart Failure (Adult)  Goal: Signs and Symptoms of Listed Potential Problems Will be Absent or Manageable (Cardiac: Heart Failure)  Outcome: Ongoing (interventions implemented as appropriate)    Problem: Fall Risk (Adult)  Goal: Absence of Falls  Outcome: Ongoing (interventions implemented as appropriate)    Problem: Pressure Ulcer Risk (Jens Scale) (Adult,Obstetrics,Pediatric)  Goal: Skin Integrity  Outcome: Ongoing (interventions implemented as appropriate)

## 2017-02-19 NOTE — DISCHARGE SUMMARY
Date of Admission: 2/15/2017  Date of Discharge:  2017    Discharge Diagnosis:  Acute congestive heart failure, combined systolic and diastolic in nature with hypoxia  Other impressions as per assessment and plan below    Presenting Problem/History of Present Illness    Shortness of breath    Hospital Course   this and was admitted with acute shortness of breath associated with hypoxia.  Patient found to be in acute congestive heart failure so IV diuretics were started.  Patient was resumed back on all her home medicines.  There were some discrepancies in her medicines since which was clarified with family members and made adjustment according to that.  Patient also had a lot of throat secretions for which Atrovent nebulizer treatment was given but it did not improve completely so scopolamine patch was given and patient did much better with that.  Patient also was discharged in origin earlier on 17 but family refused for that so finally was discharged on 17.  Home health has been ordered on discharge.  Patient also had bradycardia but family did not wanted to have cardiac evaluation done and did not wanted to have pacemaker done.  They only wanted medical management.  Family also refused for echocardiogram.    Procedures Performed:    None    Consults:  None  Consults     Date and Time Order Name Status Description    2/15/2017 2327 Hospitalist (on-call MD unless specified)            Pertinent Test Results:  Refer to chart  Echocardiogram ordered but family refused     Condition on Discharge:     Vital Signs past 24hrs  BP range: BP: (133-146)/(64-98) 142/65  Pulse range: Heart Rate:  [61-90] 90  Resp rate range: Resp:  [16-22] 18  Temp range: Temp (24hrs), Av.8 °F (36.6 °C), Min:96.8 °F (36 °C), Max:98.5 °F (36.9 °C)    O2 Device: room air   Oxygen range:SpO2:  [88 %-97 %] 90 %   119 lb 2 oz (54 kg); Body mass index is 23.27 kg/(m^2).  No intake or output data in the 24 hours ending 17  1041    Physical Exam        Subjective: Breathing is better.  congestions and secretionsare much better . No chest pain. No new complaints.   Sitter is at bedside.  Seen along with nurse .  Eating good..  No family members are present.          Review of Systems:   Review of Systems   All other systems reviewed and are negative.        Objective      Vital Signs:  Temp: [96.4 °F (35.8 °C)-98.7 °F (37.1 °C)] 96.5 °F (35.8 °C)  Heart Rate: [58-97] 58  Resp: [16-18] 18  BP: (142-148)/(60-77) 142/60     Physical Exam:  Physical Exam   Constitutional: No distress.   Eyes: Conjunctivae are normal. No scleral icterus.   Neck: Neck supple. No JVD present.   Cardiovascular: An irregular rhythm present. Bradycardia present.   No murmur heard.  Pulmonary/Chest: No respiratory distress. She has no wheezes. She has few rales but much better.   throat secretions with congestive sounds noted in both lungs , much better.   Abdominal: Soft. She exhibits no distension. There is no tenderness.   Musculoskeletal: She exhibits no edema.   Neurological: She is alert.   Skin: Skin is warm and dry. No rash noted. She is not diaphoretic.             Results Review:   Lab Results (last 24 hours)     Procedure Component Value Units Date/Time     Procalcitonin [[id number]] (Abnormal) Collected: 02/15/17 2107     Specimen: Blood Updated: 02/17/17 1315       Procalcitonin 0.10 (H) ng/mL                        Blood Culture [[id number]] (Normal) Collected: 02/15/17 2107     Specimen: Blood from Arm, Right Updated: 02/17/17 2202       Blood Culture No growth at 2 days       Blood Culture [[id number]] (Normal) Collected: 02/15/17 2107     Specimen: Blood from Arm, Left Updated: 02/17/17 2202       Blood Culture No growth at 2 days                          Blood Culture [[id number]] (Normal) Collected: 02/15/17 2107     Specimen: Blood from Arm, Right Updated: 02/17/17 2202       Blood Culture No growth at 2 days       Blood Culture [[id number]] (Normal)  Collected: 02/15/17 2107     Specimen: Blood from Arm, Left Updated: 02/17/17 2202       Blood Culture No growth at 2 days              Assessment/Plan:  1. Acute congestive heart failure, probably both systolic and diastolic in nature, with hypoxia. Hypoxia improved.  -IV diuretics, monitor cardiac enzymes are negative, monitor electrolytes and kidney functions  -Family has refused for echocardiogram  -Oxygen saturation more than 90% on room air.  2. Hyponatremia, little better  -Monitor sodium  3. Dementia, advanced  -Continue home medicines   4. DNR as per family members, discuss at length  5. Bradycardia, suggestive of sick sinus syndrome  -No pacemaker as per family members  -Better   6. Chronic kidney disease, little worse probably due to diuretics  7. Throat secretions with congestive lung sounds  -Started Atrovent inhaler on 2/17/17, albuterol is listed in allergies  -Put on scopolamine patch on 2/18/17, now doing much better       Discharge Disposition  Home or Self Care    Discharge Medications     Your medication list      START taking these medications       Instructions Last Dose Given Next Dose Due    furosemide 20 MG tablet   Commonly known as:  LASIX        Take 1 tablet by mouth Daily.         ipratropium 0.02 % nebulizer solution   Commonly known as:  ATROVENT        Take 2.5 mL by nebulization 4 (Four) Times a Day As Needed for wheezing or shortness of air.         Scopolamine 1.5 MG/3DAYS patch   Commonly known as:  TRANSDERM-SCOP        Place 1 patch on the skin Every 72 (Seventy-Two) Hours.           CONTINUE taking these medications       Instructions Last Dose Given Next Dose Due    allopurinol 100 MG tablet   Commonly known as:  ZYLOPRIM        TAKE 1 TABLET BY MOUTH EVERY DAY FOR GOUT         ASPIRIN LOW DOSE 81 MG tablet   Generic drug:  aspirin              CHOLECALCIFEROL PO              donepezil 10 MG tablet   Commonly known as:  ARICEPT              hydrocortisone-zinc  oxide-bacitracin-nystatin cream        Apply 1 application topically 3 (Three) Times a Day.         losartan 25 MG tablet   Commonly known as:  COZAAR        TAKE 1/2 TABLET BY MOUTH DAILY AS NEEDED IF SYSTOLIC BLOOD PRESSURE OVER 150         megestrol 40 MG/ML suspension   Commonly known as:  MEGACE        TAKE 5 ML BY MOUTH 2 TIMES PER DAY         MULTIVITAMIN PO              nystatin 026671 UNIT/GM ointment   Commonly known as:  MYCOSTATIN              raNITIdine 75 MG tablet   Commonly known as:  ZANTAC              Unable to find              vitamin B-12 1000 MCG tablet   Commonly known as:  CYANOCOBALAMIN                STOP taking these medications          latanoprost 0.005 % ophthalmic solution   Commonly known as:  XALATAN           ofloxacin 0.3 % ophthalmic solution   Commonly known as:  OCUFLOX                Where to Get Your Medications      These medications were sent to [facility] - [place], [place] - [address] [facility] - [phone]  - [phone] [address] [address]     Phone:  [phone]    • furosemide 20 MG tablet   • ipratropium 0.02 % nebulizer solution   • Scopolamine 1.5 MG/3DAYS patch         Eyedrops were discontinued as patient has not been using it at home per family members     Discharge Diet:  Diet Order   Procedures   • Diet Pureed; Cardiac       Activity at Discharge:  Back to baseline activity prior to admission    Follow-up Appointments  Follow-up Information     Follow up with Yolette Egan MD .    Specialty:  Family Medicine    Why:  Follow up in one week on Thursday Feb. 23 at 2:45pm.  BMP on visit    Contact information:    [address] [place] [address] [phone]          Referrals and Follow-ups to Schedule     Ambulatory Referral to Home Health    As directed    Face to Face Visit Date:  2/18/2017   Follow-up Provider for Plan of Care?:  I treated the patient in an acute care facility and will not continue treatment after discharge.    Follow-up Provider:  LUPE MOORE   Reason/Clinical Findings:  weakness, chf, OA   Describe mobility limitations that make leaving home difficult:  Generalized weakness, generalized osteoarthritis, functional decline   Nursing/Therapeutic Services Requested:  Skilled Nursing   Skilled nursing orders:  Medication education   Frequency:  1 Week 1                 Discharge Instructions    Call home health or report to ER if shortness of breath or fever or any other new problems  Routine nursing care and frequent change in posture to prevent bedsores    Test Results Pending at Discharge   Order Current Status    Gold Top - SST Collected (02/15/17 2127)    Green Top (Gel) Collected (02/15/17 2128)    [place] Draw Collected (02/15/17 2127)    Blood Culture Preliminary result    Blood Culture Preliminary result           Bhargavi Kovacs MD  02/19/17  10:41 AM    Time: I spent > 30mins in the discharge planning of this patient.    EMR Dragon/Transcription disclaimer:   Much of this encounter note is an electronic transcription/translation of spoken language to printed text. The electronic translation of spoken language may permit erroneous, or at times, nonsensical words or phrases to be inadvertently transcribed; Although I have reviewed the note for such errors, some may still exist.

## 2017-02-19 NOTE — DISCHARGE PLACEMENT REQUEST
"Мария Watson (89 y.o. Female)     Date of Birth Social Security Number Address Home Phone MRN    10/20/1918  [address] [place] [address] [phone] [id number]    Yarsanism Marital Status          Hinduism        Admission Date Admission Type Admitting Provider Attending Provider Department, Room/Bed    2/15/17 Emergency KovacsBhargavi celestin MD Mycle, Noble Sam, MD [address], 404/1    Discharge Date Discharge Disposition Discharge Destination         Home or Self Care             Attending Provider: Jesus Villafuerte MD     Allergies:  Albuterol, Univasc [Moexipril]    Isolation:  None   Infection:  None   Code Status:  Conditional    Ht:  60\" (152.4 cm)   Wt:  119 lb 2 oz (54 kg)    Admission Cmt:  None   Principal Problem:  None                Active Insurance as of 2/15/2017     Primary Coverage     Payor Plan Insurance Group Employer/Plan Group    MEDICARE MEDICARE A & B      Payor Plan Address Payor Plan Phone Number Effective From Effective To    [address] [phone] 2/1/2017     [place] [address]       Subscriber Name Subscriber Birth Date Member ID       МАРИЯ WATSON 10/20/1918 [id number]           Secondary Coverage     Payor Plan Insurance Group Employer/Plan Group    MISC COMMERCIAL MISC COMMERCIAL INS      Coverage Address Coverage Phone Number Effective From Effective To    [address] [address] [phone] 1/1/2016     [place] [address]       Subscriber Name Subscriber Birth Date Member ID       TONIO WATSON 8/25/1905 [id number]                 Emergency Contacts      (Rel.) Home Phone Work Phone Mobile Phone    Jyotsna Long (Daughter) [phone] -- --    FritzCaitlin (Daughter) [phone] -- --               History & Physical      Jesus Villafuerte MD at 2/16/2017  4:21 AM                [facility] Medicine Admission      Date of Admission: 2/15/2017      Primary Care Physician: " Yolette Egan MD    Following information is obtained partially from patient, family and/or medical records.    Chief Complaint:   Chief Complaint   Patient presents with   • Shortness of Breath       HPI: Pt is a 89 y.o. female presenting to the ER with known history of congestive heart failure.  According to family patient has been progressively having gurgling noises and sounding congested over the last 3-4 days.  Patient also had some cough.  History is limited as patient has dementia and does not communicate sufficiently.       Concurrent Medical History:   Patient Active Problem List   Diagnosis   • Type 2 diabetes mellitus   • Sick sinus syndrome   • Senile dementia   • Renal failure syndrome   • Peripheral venous insufficiency   • Essential hypertension   • Gastroesophageal reflux disease   • Gout   • Diabetic polyneuropathy   • Cortical senile cataract   • Cobalamin deficiency   • Acute on chronic congestive heart failure       Past Surgical History:   Past Surgical History   Procedure Laterality Date   • Appendectomy  07/21/1970     incidental   • Central venous line insertion  04/05/2016     Successful placement of right upper extremity 6-[place]gu[place] triple lumen PICC line.   • Finger nail surgery       Debride nail 6 or more x9   • Injection of medication       Kenalog x2   • Total abdominal hysterectomy with salpingo oophorectomy  07/21/1970     uterine myomata       Family History: family history includes Cancer in her other; Coronary artery disease in her other; Diabetes in her other; Hypertension in her other.    Social History:  reports that she has never smoked. She does not have any smokeless tobacco history on file. She reports that she does not drink alcohol.    Allergies:   Allergies   Allergen Reactions   • Albuterol    • Univasc [Moexipril]        Home Medications:   Prior to Admission medications    Medication Sig Start Date End Date Taking? Authorizing Provider   allopurinol (ZYLOPRIM) 100  MG tablet TAKE 1 TABLET BY MOUTH EVERY DAY FOR GOUT 10/24/16  Yes Yolette Egan MD   aspirin (ASPIRIN LOW DOSE) 81 MG tablet Take 81 mg by mouth daily.   Yes Historical Provider, MD   CHOLECALCIFEROL PO Take 1 tablet by mouth 2 (two) times a day.   Yes Historical Provider, MD   donepezil (ARICEPT) 10 MG tablet Take 10 mg by mouth daily.   Yes Historical Provider, MD   hydrocortisone-zinc oxide-bacitracin-nystatin cream Apply 1 application topically 3 (Three) Times a Day. 1/30/17  Yes Yolette Egan MD   latanoprost (XALATAN) 0.005 % ophthalmic solution Apply 1 drop to eye at night as needed.   Yes Historical Provider, MD   losartan (COZAAR) 25 MG tablet TAKE 1/2 TABLET BY MOUTH DAILY AS NEEDED IF SYSTOLIC BLOOD PRESSURE OVER 150 1/9/17  Yes Yolette Egan MD   megestrol (MEGACE) 40 MG/ML suspension TAKE 5 ML BY MOUTH 2 TIMES PER DAY 2/10/17  Yes Yolette Egan MD   Multiple Vitamins-Minerals (MULTIVITAMIN PO) Take 1 tablet by mouth daily.   Yes Historical Provider, MD   nystatin (MYCOSTATIN) 484961 UNIT/GM ointment  9/28/16  Yes Historical Provider, MD   ofloxacin (OCUFLOX) 0.3 % ophthalmic solution Administer 1 drop into the left eye 4 (Four) Times a Day. 10/19/16  Yes Yolette Egan MD   ranitidine (ZANTAC) 75 MG tablet Take 75 mg by mouth 2 (Two) Times a Day As Needed.   Yes Historical Provider, MD   Unable to find Med Name: oxyquinoline-emollient   Dose: 1 application  Route: Topical  Frequency: Daily   Yes Historical Provider, MD   vitamin B-12 (CYANOCOBALAMIN) 1000 MCG tablet Take 500 mcg by mouth Daily.   Yes Historical Provider, MD       Review of Systems:  Review of Systems   Unable to perform ROS: Dementia      Otherwise complete ROS is negative except as mentioned above and in HPI.    Physical Exam:   Temp:  [96 °F (35.6 °C)-97.9 °F (36.6 °C)] 96 °F (35.6 °C)  Heart Rate:  [66-94] 87  Resp:  [16-33] 16  BP: (139-221)/() 168/88  Physical Exam   Constitutional: She is oriented to  person, place, and time. She appears well-developed and well-nourished.   HENT:   Head: Normocephalic and atraumatic.   Nose: Nose normal.   Mouth/Throat: Oropharynx is clear and moist.   Eyes: Conjunctivae are normal. Pupils are equal, round, and reactive to light. No scleral icterus.   Neck: No tracheal deviation present.   Cardiovascular: Normal heart sounds.  Exam reveals no friction rub.    Pulmonary/Chest: Effort normal. No respiratory distress. She has no wheezes. She has rales.   Upper airway noise noted, patient does not follow instructions to cough and clear airway.  Some scattered rales noted   Abdominal: Soft. Bowel sounds are normal. She exhibits no distension. There is no tenderness.   Musculoskeletal: Normal range of motion. She exhibits edema.   Neurological: She is alert and oriented to person, place, and time.   Skin: Skin is warm and dry.         Results Reviewed:  I have personally reviewed current lab, radiology, and data and agree with results.  Lab Results (last 24 hours)     Procedure Component Value Units Date/Time    Procalcitonin [[id number]] Collected:  02/15/17 2107    Specimen:  Blood Updated:  02/15/17 2112    Blood Culture [[id number]] Collected:  02/15/17 2107    Specimen:  Blood from Arm, Right Updated:  02/15/17 2112    Blood Culture [[id number]] Collected:  02/15/17 2107    Specimen:  Blood from Arm, Left Updated:  02/15/17 2113    Lactic Acid, Plasma [[id number]]  (Normal) Collected:  02/15/17 2107    Specimen:  Blood Updated:  02/15/17 2129     Lactate 1.1 mmol/L     Comprehensive Metabolic Panel [[id number]]  (Abnormal) Collected:  02/15/17 2107    Specimen:  Blood Updated:  02/15/17 2131     Glucose 145 (H) mg/dL      BUN 26 (H) mg/dL      Creatinine 1.12 (H) mg/dL      Sodium 126 (L) mmol/L      Potassium 4.8 mmol/L      Chloride 92 (L) mmol/L      CO2 26.0 mmol/L      Calcium 9.7 mg/dL      Total Protein 7.2 g/dL      Albumin 3.50 g/dL      ALT (SGPT) 28 U/L      AST (SGOT) 31 U/L       Alkaline Phosphatase 122 U/L      Total Bilirubin 0.3 mg/dL      eGFR  Fawad Amer 54 mL/min/1.73      Globulin 3.7 (H) gm/dL      A/G Ratio 0.9 (L) g/dL      BUN/Creatinine Ratio 23.2      Anion Gap 8.0 mmol/L     Narrative:       The MDRD GFR formula is only valid for adults with stable renal function between ages 18 and 70.    Protime-INR [[id number]]  (Normal) Collected:  02/15/17 2107    Specimen:  Blood Updated:  02/15/17 2133     Protime 12.7 Seconds      INR 0.95     Narrative:       Therapeutic range for most indications is 2.0-3.0 INR,  or 2.5-3.5 for mechanical heart valves.    Troponin [[id number]]  (Normal) Collected:  02/15/17 2107    Specimen:  Blood Updated:  02/15/17 2142     Troponin I 0.018 ng/mL     BNP [[id number]]  (Abnormal) Collected:  02/15/17 2107    Specimen:  Blood Updated:  02/15/17 2142     proBNP 2070.0 (H) pg/mL        family declined blood draw for a CBC    Imaging Results (last 24 hours)     Procedure Component Value Units Date/Time    XR Chest 1 View [[id number]] Collected:  02/15/17 2027     Updated:  02/15/17 2032    Narrative:       Patient Name:  CARLOS Marmolejo ID:  [id number] Ordering:   TIFFANIE STUARTAttending:  TIFFANIE STUARTReferring:  TIFFANIE STUART------------------------------------------------PROCEDURE:  XR CHEST 1 VIEW    INDICATION FOR PROCEDURE:  89 years -old patient presents for  evaluation of chest pain.    COMPARISON:  None    FINDINGS: XR CHEST one view reveals the lungs are underexpanded.  Cardiac monitoring leads are present. There appears to be a  hiatal hernia. The thoracic aorta is tortuous with vascular  calcifications. Cardiac silhouette is mildly enlarged. There may  be small right-sided pleural effusion. There is mild prominence  of bronchovascular markings    There is no radiographic evidence for airspace consolidation or  pneumothorax. Mediastinal silhouette is within normal limits.     The bones appear osteopenic. There is degenerative arthropathy  of  both shoulders.        Impression:       Mild cardiomegaly with pulmonary congestion.     Electronically signed by:  Mariaa Tenorio MD  2/15/2017 8:31 PM CST  Workstation: [employer]              Assessment and Plan:  1.  Mild acute on chronic diastolic dysfunction: Last echo done in April 2016.  We'll repeat echo.  Will place patient on mild diuretic.  2.  Hyponatremia: Fluid restriction.      Jesus Villafuerte MD  02/16/17  4:21 AM                     Electronically signed by Jesus Villafuerte MD at 2/16/2017  4:41 AM      Bhargavi Kovacs MD at 2/16/2017 11:21 AM          hospitalist  Active Problems:    Acute on chronic congestive heart failure        Subjective: Breathing is better.  No chest pain.  No new complaints.  Family members are at bedside.  Seen along with nurse and .        Review of Systems:    Review of Systems   All other systems reviewed and are negative.      Objective     Vital Signs:  Temp:  [96 °F (35.6 °C)-97.9 °F (36.6 °C)] 97.7 °F (36.5 °C)  Heart Rate:  [41-94] 41  Resp:  [16-33] 16  BP: (139-221)/() 142/90    Physical Exam:   Physical Exam   Constitutional: No distress.   Eyes: Conjunctivae are normal. No scleral icterus.   Neck: Neck supple. No JVD present.   Cardiovascular: An irregular rhythm present. Bradycardia present.    No murmur heard.  Pulmonary/Chest: No respiratory distress. She has no wheezes. She has rales.   Abdominal: Soft. She exhibits no distension. There is no tenderness.   Musculoskeletal: She exhibits no edema.   Neurological: She is alert.   Skin: Skin is warm and dry. No rash noted. She is not diaphoretic.          Results Review:       Lab Results (last 24 hours)     Procedure Component Value Units Date/Time    Procalcitonin [[id number]] Collected:  02/15/17 2107    Specimen:  Blood Updated:  02/15/17 2112    Lactic Acid, Plasma [[id number]]  (Normal) Collected:  02/15/17 2107    Specimen:  Blood Updated:  02/15/17 2129     Lactate 1.1 mmol/L      Comprehensive Metabolic Panel [[id number]]  (Abnormal) Collected:  02/15/17 2107    Specimen:  Blood Updated:  02/15/17 2131     Glucose 145 (H) mg/dL      BUN 26 (H) mg/dL      Creatinine 1.12 (H) mg/dL      Sodium 126 (L) mmol/L      Potassium 4.8 mmol/L      Chloride 92 (L) mmol/L      CO2 26.0 mmol/L      Calcium 9.7 mg/dL      Total Protein 7.2 g/dL      Albumin 3.50 g/dL      ALT (SGPT) 28 U/L      AST (SGOT) 31 U/L      Alkaline Phosphatase 122 U/L      Total Bilirubin 0.3 mg/dL      eGFR   Amer 54 mL/min/1.73      Globulin 3.7 (H) gm/dL      A/G Ratio 0.9 (L) g/dL      BUN/Creatinine Ratio 23.2      Anion Gap 8.0 mmol/L     Narrative:       The MDRD GFR formula is only valid for adults with stable renal function between ages 18 and 70.    Protime-INR [[id number]]  (Normal) Collected:  02/15/17 2107    Specimen:  Blood Updated:  02/15/17 2133     Protime 12.7 Seconds      INR 0.95     Narrative:       Therapeutic range for most indications is 2.0-3.0 INR,  or 2.5-3.5 for mechanical heart valves.    Troponin [[id number]]  (Normal) Collected:  02/15/17 2107    Specimen:  Blood Updated:  02/15/17 2142     Troponin I 0.018 ng/mL     BNP [[id number]]  (Abnormal) Collected:  02/15/17 2107    Specimen:  Blood Updated:  02/15/17 2142     proBNP 2070.0 (H) pg/mL     Blood Culture [[id number]]  (Normal) Collected:  02/15/17 2107    Specimen:  Blood from Arm, Right Updated:  02/16/17 1001     Blood Culture No growth at less than 24 hours     Blood Culture [[id number]]  (Normal) Collected:  02/15/17 2107    Specimen:  Blood from Arm, Left Updated:  02/16/17 1001     Blood Culture No growth at less than 24 hours         Imaging Results (last 24 hours)     Procedure Component Value Units Date/Time    XR Chest 1 View [[id number]] Collected:  02/15/17 2027     Updated:  02/15/17 2032    Narrative:       Patient Name:  CARLOS MUNGUIAHuy ID:  [id number] Ordering:   TIFFANIE HARMAN ENGDAHLAttending:  TIFFANIE ENGDAHLReferring:  TIFFANIE  L  ENGDAHL------------------------------------------------PROCEDURE:  XR CHEST 1 VIEW    INDICATION FOR PROCEDURE:  89 years -old patient presents for  evaluation of chest pain.    COMPARISON:  None    FINDINGS: XR CHEST one view reveals the lungs are underexpanded.  Cardiac monitoring leads are present. There appears to be a  hiatal hernia. The thoracic aorta is tortuous with vascular  calcifications. Cardiac silhouette is mildly enlarged. There may  be small right-sided pleural effusion. There is mild prominence  of bronchovascular markings    There is no radiographic evidence for airspace consolidation or  pneumothorax. Mediastinal silhouette is within normal limits.     The bones appear osteopenic. There is degenerative arthropathy of  both shoulders.        Impression:       Mild cardiomegaly with pulmonary congestion.     Electronically signed by:  Mariaa Tenorio MD  2/15/2017 8:31 PM [employer]  Workstation: [employer]              Medication Review: medications have been reviewed    Assessment/Plan:  1.  Acute congestive heart failure, probably both systolic and diastolic in nature  -IV diuretics, monitor cardiac enzymes, monitor electrolytes and kidney functions  2.  Hyponatremia  -Monitor sodium  3.  Dementia, advanced  -Continue home medicines in  4.  DNR as per family members, discuss at length  5.  Bradycardia, suggestive of sick sinus syndrome  -No pacemaker as per family members    Probably home in 1-2 days  DVT prophylaxis: Lovenox          Bhargavi Kovacs MD  02/16/17  11:22 AM             Electronically signed by Bhargavi Kovacs MD at 2/16/2017 11:30 AM      Bhargavi Kovacs MD at 2/17/2017 11:49 AM          hospitalist  Active Problems:    Acute on chronic congestive heart failure        Subjective: Breathing is better.  But having a lot of congestions and secretions.  Does mouth breathing but not unusual according to son.  No chest pain.  No new complaints.  Family members are at  bedside.  Seen along with nurse and .        Review of Systems:    Review of Systems   All other systems reviewed and are negative.      Objective     Vital Signs:  Temp:  [96.5 °F (35.8 °C)-97.6 °F (36.4 °C)] 97.6 °F (36.4 °C)  Heart Rate:  [56-80] 80  Resp:  [16-18] 18  BP: (124-178)/(68-92) 178/84    Physical Exam:   Physical Exam   Constitutional: No distress.   Eyes: Conjunctivae are normal. No scleral icterus.   Neck: Neck supple. No JVD present.   Cardiovascular: An irregular rhythm present. Bradycardia present.    No murmur heard.  Pulmonary/Chest: No respiratory distress. She has no wheezes. She has rales.    throat secretions with congestive sounds noted in both lungs.   Abdominal: Soft. She exhibits no distension. There is no tenderness.   Musculoskeletal: She exhibits no edema.   Neurological: She is alert.   Skin: Skin is warm and dry. No rash noted. She is not diaphoretic.          Results Review:       Lab Results (last 24 hours)     Procedure Component Value Units Date/Time    Blood Culture [[id number]]  (Normal) Collected:  02/15/17 2107    Specimen:  Blood from Arm, Right Updated:  02/16/17 2201     Blood Culture No growth at 24 hours     Blood Culture [[id number]]  (Normal) Collected:  02/15/17 2107    Specimen:  Blood from Arm, Left Updated:  02/16/17 2201     Blood Culture No growth at 24 hours     CBC Auto Differential [[id number]]  (Abnormal) Collected:  02/17/17 0532    Specimen:  Blood Updated:  02/17/17 0645     WBC 7.77 10*3/mm3      RBC 3.11 (L) 10*6/mm3      Hemoglobin 9.8 (L) g/dL      Hematocrit 27.7 (L) %      MCV 89.1 fL      MCH 31.5 pg      MCHC 35.4 g/dL      RDW 13.9 %      RDW-SD 45.4 fl      MPV 9.6 fL      Platelets 278 10*3/mm3      Neutrophil % 60.3 %      Lymphocyte % 30.5 %      Monocyte % 8.6 %      Eosinophil % 0.4 %      Basophil % 0.1 %      Immature Grans % 0.1 %      Neutrophils, Absolute 4.68 10*3/mm3      Lymphocytes, Absolute 2.37 10*3/mm3      Monocytes,  Absolute 0.67 10*3/mm3      Eosinophils, Absolute 0.03 10*3/mm3      Basophils, Absolute 0.01 10*3/mm3      Immature Grans, Absolute 0.01 10*3/mm3     CBC & Differential [[id number]] Collected:  02/17/17 0532    Specimen:  Blood Updated:  02/17/17 0645    Narrative:       The following orders were created for panel order CBC & Differential.  Procedure                               Abnormality         Status                     ---------                               -----------         ------                     CBC Auto Differential[[id number]]         Abnormal            Final result                 Please view results for these tests on the individual orders.    Basic Metabolic Panel [[id number]]  (Abnormal) Collected:  02/17/17 0532    Specimen:  Blood Updated:  02/17/17 0715     Glucose 101 (H) mg/dL      BUN 39 (H) mg/dL      Creatinine 1.10 (H) mg/dL      Sodium 129 (L) mmol/L      Potassium 4.8 mmol/L      Chloride 95 mmol/L      CO2 29.0 mmol/L      Calcium 9.0 mg/dL      eGFR   Amer 56 mL/min/1.73      BUN/Creatinine Ratio 35.5 (H)      Anion Gap 5.0 mmol/L     Narrative:       The MDRD GFR formula is only valid for adults with stable renal function between ages 18 and 70.        Imaging Results (last 24 hours)     Procedure Component Value Units Date/Time    XR Chest 1 View [[id number]] Collected:  02/15/17 2027     Updated:  02/15/17 2032    Narrative:       Patient Name:  CARLOS Marmolejo ID:  [id number] Ordering:   TIFFANIE STUARTAttending:  TIFFANIE STUARTReferring:  TIFFANIE STUART------------------------------------------------PROCEDURE:  XR CHEST 1 VIEW    INDICATION FOR PROCEDURE:  89 years -old patient presents for  evaluation of chest pain.    COMPARISON:  None    FINDINGS: XR CHEST one view reveals the lungs are underexpanded.  Cardiac monitoring leads are present. There appears to be a  hiatal hernia. The thoracic aorta is tortuous with vascular  calcifications. Cardiac silhouette is mildly  enlarged. There may  be small right-sided pleural effusion. There is mild prominence  of bronchovascular markings    There is no radiographic evidence for airspace consolidation or  pneumothorax. Mediastinal silhouette is within normal limits.     The bones appear osteopenic. There is degenerative arthropathy of  both shoulders.        Impression:       Mild cardiomegaly with pulmonary congestion.     Electronically signed by:  Mariaa Tenorio MD  2/15/2017 8:31 PM [facility]  Workstation: [email or web address]              Medication Review: medications have been reviewed    Assessment/Plan:  1.  Acute congestive heart failure, probably both systolic and diastolic in nature  -IV diuretics, monitor cardiac enzymes are negative, monitor electrolytes and kidney functions  2.  Hyponatremia, little better  -Monitor sodium  3.  Dementia, advanced  -Continue home medicines in  4.  DNR as per family members, discuss at length  5.  Bradycardia, suggestive of sick sinus syndrome  -No pacemaker as per family members  -Better today  6.  Chronic kidney disease, little worse probably due to diuretics  7.  Throat secretions with congestive lung sounds  -Start Atrovent inhaler, albuterol is listed in allergies    Probably home tomorrow  DVT prophylaxis: Lovenox          Bhargavi Kovacs MD  02/17/17  11:49 AM             Electronically signed by Bhargavi Kovacs MD at 2/17/2017 11:53 AM           Physician Progress Notes (last 72 hours) (Notes from 2/16/2017  1:19 PM through 2/19/2017  1:19 PM)      Bhargavi Kovacs MD at 2/18/2017 10:11 AM  Version 1 of 1         hospitalist  Active Problems:    Acute on chronic congestive heart failure        Subjective: Breathing is better.  But continued to have some congestions and secretions.    No chest pain.  No new complaints.  Daughter is at bedside.  Seen along with nurse and respiratory therapist.  Eating fair.  Family had a lot of questions early this morning and nocturia and is  hospitalist Dr. Naylor was called into answered all of the questions.  He did spend a lot of time with the family members at least 15-20 minutes or more explaining and answering some of the questions.      Review of Systems:    Review of Systems   All other systems reviewed and are negative.      Objective     Vital Signs:  Temp:  [96.4 °F (35.8 °C)-98.7 °F (37.1 °C)] 96.5 °F (35.8 °C)  Heart Rate:  [58-97] 58  Resp:  [16-18] 18  BP: (142-148)/(60-77) 142/60    Physical Exam:   Physical Exam   Constitutional: No distress.   Eyes: Conjunctivae are normal. No scleral icterus.   Neck: Neck supple. No JVD present.   Cardiovascular: An irregular rhythm present. Bradycardia present.    No murmur heard.  Pulmonary/Chest: No respiratory distress. She has no wheezes. She has few rales but much better.    throat secretions with congestive sounds noted in both lungs but better than yesterday.   Abdominal: Soft. She exhibits no distension. There is no tenderness.   Musculoskeletal: She exhibits no edema.   Neurological: She is alert.   Skin: Skin is warm and dry. No rash noted. She is not diaphoretic.          Results Review:    Lab Results (last 24 hours)     Procedure Component Value Units Date/Time    Procalcitonin [[id number]]  (Abnormal) Collected:  02/15/17 2107    Specimen:  Blood Updated:  02/17/17 1315     Procalcitonin 0.10 (H) ng/mL       The change of PCT concentration over time provides  prognostic information about the risk of mortality within  28 days for patients diagnosed with severe sepsis or septic  shock coming from the emergency department, ICU, other  medical wards, or directly from outside the hospital. Data  supports the use of PCT determinations from the day severe  sepsis or septic shock is first diagnosed (Day 0) or the day  thereafter (Day 1) and the fourth day after diagnosis  (Day 4) for the classification of patients into higher and  lower risk for mortality within 28 days according to  the  workflow below:                PCT Day 0(or Day 1) - PCT Day 4  delta PCT  = ________________________________  X 100%                     PCT Day 0 (or Day 1)  A decrease of PCT levels below or equal to 80% defines a  positive delta PCT test result representing a higher  risk for 28-day all-cause mortality of patients diagnosed  with severe sepsis or septic shock.  A decreased of PCT levels of more than 80% defines a  negative delta PCT result representing a lower risk for  28-day all-cause mortality of patients diagnosed with  severe sepsis or septic shock.  To determine delta PCT results from the absolute PCT  concentrations of a patient obtained on the day severe  sepsis or septic shock was first diagnosed (or 24 hours  later) and on Day 4, go to [email or web address].                **Please note reference interval change**       Narrative:       Performed at:  01 - [facility] [address]  [id number]  : Adrián Garza MD, Phone:  [phone]    Blood Culture [[id number]]  (Normal) Collected:  02/15/17 2107    Specimen:  Blood from Arm, Right Updated:  02/17/17 2202     Blood Culture No growth at 2 days     Blood Culture [[id number]]  (Normal) Collected:  02/15/17 2107    Specimen:  Blood from Arm, Left Updated:  02/17/17 2202     Blood Culture No growth at 2 days            Performed at:  01 - [facility] [address]  [id number]  : Adrián Garza MD, Phone:  [phone]    Blood Culture [[id number]]  (Normal) Collected:  02/15/17 2107    Specimen:  Blood from Arm, Right Updated:  02/17/17 2202     Blood Culture No growth at 2 days     Blood Culture [[id number]]  (Normal) Collected:  02/15/17 2107    Specimen:  Blood from Arm, Left Updated:  02/17/17 2202     Blood Culture No growth at 2 days               Medication Review: medications have been reviewed    Assessment/Plan:  1.  Acute congestive heart failure, probably both  systolic and diastolic in nature, with hypoxia.  Hypoxia improved.  -IV diuretics, monitor cardiac enzymes are negative, monitor electrolytes and kidney functions  -Family has refused for echocardiogram  -Oxygen saturation more than 90% on room air.  2.  Hyponatremia, little better  -Monitor sodium  3.  Dementia, advanced  -Continue home medicines   4.  DNR as per family members, discuss at length  5.  Bradycardia, suggestive of sick sinus syndrome  -No pacemaker as per family members  -Better   6.  Chronic kidney disease, little worse probably due to diuretics  7.  Throat secretions with congestive lung sounds  -Started Atrovent inhaler on 17, albuterol is listed in allergies  -Put on scopolamine patch    Patient is medically stable and ready to be discharged today.  Maximum benefit of hospitalization has been achieved.  Daughter does not agree with about disease in.  We will have case management to come and see patient and discuss with family members.  Also case management to see for home health on discharge.  Total time spent more than 35 minutes.  DVT prophylaxis: Lovenox       Electronically signed by Bhargavi Kovacs MD at 2017 10:20 AM          [address] [address]  Phone: [phone]  Fax:         Patient:     Мария López MRN: [id number]   [address] [place] [address] : 10/20/1918  SSN:    Phone: [phone] Sex: F      INSURANCE PAYOR PLAN [employer] # SUBSCRIBER ID   Primary:  Secondary: MEDICARE  MISC COMMERCIAL [id number]  [id number]   [id number]  [id number]   Admitting Diagnosis: Acute on chronic congestive heart failure, unspecified congestive heart failure type [I50.9]  Order Date: 2017               Home Nebulizer    (Order ID: [id number])   Diagnosis: Acute on chronic congestive heart failure, unspecified congestive heart failure type (I50.9 [ICD-10-CM] 428.0 [ICD-9-CM])      Priority: Routine Expected  Date:   Expiration Date:        Interval:  Count:    Nebulizer Equipment:  Nebulizer w/ Compressor  Nebulizer Accessories:  Nebulizer Kit - Administration Set, Non-Disposable  The face to face evaluation was performed on: 2/18/2017  Length of Need (99 Months = Lifetime): 99 Months = Lifetime     Specimen Type:   Specimen Source:   Specimen Taken Date:   Specimen Taken Time:      Order Entered By: BHARGAVI DE LA ROSA 2/18/2017 11:14 AM                         Electronically signed by: Bhargavi De La Rosa MD (NPI: [id number])

## 2017-02-19 NOTE — PLAN OF CARE
Problem: Patient Care Overview (Adult)  Goal: Plan of Care Review  Outcome: Outcome(s) achieved Date Met:  02/19/17  Goal: Adult Individualization and Mutuality  Outcome: Outcome(s) achieved Date Met:  02/19/17  Goal: Discharge Needs Assessment  Outcome: Outcome(s) achieved Date Met:  02/19/17    Problem: Cardiac: Heart Failure (Adult)  Goal: Signs and Symptoms of Listed Potential Problems Will be Absent or Manageable (Cardiac: Heart Failure)  Outcome: Outcome(s) achieved Date Met:  02/19/17    Problem: Fall Risk (Adult)  Goal: Absence of Falls  Outcome: Outcome(s) achieved Date Met:  02/19/17    Problem: Pressure Ulcer Risk (Jens Scale) (Adult,Obstetrics,Pediatric)  Goal: Skin Integrity  Outcome: Outcome(s) achieved Date Met:  02/19/17

## 2017-02-24 NOTE — PROGRESS NOTES
Марияilana López  10/20/1918  89 y.o. female   PCP: Jignesh 10/19/2016  BS-101 (02/17/2017)    02/24/17    Chief Complaint   Patient presents with   • Left Foot - diabetic nail care   • Right Foot - diabetic nail care           History of Present Illness    Patient presents with her daughter for routine diabetic foot care.  Daughter is the primary historian.  Patient's nails are long and thick. They cause her pain.  There are no other pedal complaints.         Past Medical History   Diagnosis Date   • Age-related physical debility    • Anxiety disorder    • Atopic dermatitis and related condition    • Benign essential hypertension    • Benign hypertension    • Borderline glaucoma    • Chronic kidney disease    • Cobalamin deficiency    • Cortical senile cataract    • Diabetes mellitus    • Diabetes mellitus without complication    • Diabetic polyneuropathy    • Edema      But stable   • Essential hypertension    • Gastroesophageal reflux disease    • Gout    • Hallux valgus, acquired, bilateral    • Hand joint pain    • Malaise and fatigue    • Memory impairment    • Memory loss    • Need for prophylactic vaccination and inoculation against influenza    • Neurologic disorder associated with diabetes mellitus    • Nuclear cataract    • Onychogryposis    • Pain in lower limb    • Peripheral venous insufficiency    • Renal failure syndrome    • Senile dementia    • Sick sinus syndrome    • Swelling of limb      Right hand and forearm      • Type 2 diabetes mellitus          Past Surgical History   Procedure Laterality Date   • Appendectomy  07/21/1970     incidental   • Central venous line insertion  04/05/2016     Successful placement of right upper extremity 6-[place] triple lumen PICC line.   • Finger nail surgery       Debride nail 6 or more x9   • Injection of medication       Kenalog x2   • Total abdominal hysterectomy with salpingo oophorectomy  07/21/1970     uterine myomata         Family History   Problem  Relation Age of Onset   • Coronary artery disease Other    • Cancer Other    • Diabetes Other    • Hypertension Other          Social History     Social History   • Marital status:      Spouse name: N/A   • Number of children: N/A   • Years of education: N/A     Occupational History   • Not on file.     Social History Main Topics   • Smoking status: Never Smoker   • Smokeless tobacco: Not on file   • Alcohol use No   • Drug use: Not on file   • Sexual activity: Not on file     Other Topics Concern   • Not on file     Social History Narrative         Current Outpatient Prescriptions   Medication Sig Dispense Refill   • allopurinol (ZYLOPRIM) 100 MG tablet TAKE 1 TABLET BY MOUTH EVERY DAY FOR GOUT 30 tablet 6   • aspirin (ASPIRIN LOW DOSE) 81 MG tablet Take 81 mg by mouth daily.     • cholecalciferol (VITAMIN D3) 1000 UNITS tablet Take 1,000 Units by mouth 2 (Two) Times a Day.     • CHOLECALCIFEROL PO Take 1 tablet by mouth 2 (two) times a day.     • donepezil (ARICEPT) 10 MG tablet Take 10 mg by mouth daily.     • furosemide (LASIX) 20 MG tablet Take 1 tablet by mouth Daily. 30 tablet 0   • hydrocortisone-zinc oxide-bacitracin-nystatin cream Apply 1 application topically 3 (Three) Times a Day. 4 g 3   • ipratropium (ATROVENT) 0.02 % nebulizer solution Take 2.5 mL by nebulization 4 (Four) Times a Day As Needed for wheezing or shortness of air. 100 mL 0   • losartan (COZAAR) 25 MG tablet TAKE 1/2 TABLET BY MOUTH DAILY AS NEEDED IF SYSTOLIC BLOOD PRESSURE OVER 150 15 tablet 2   • megestrol (MEGACE) 40 MG/ML suspension TAKE 5 ML BY MOUTH 2 TIMES PER  mL 3   • Multiple Vitamins-Minerals (MULTIVITAMIN PO) Take 1 tablet by mouth daily.     • nystatin (MYCOSTATIN) 727653 UNIT/GM ointment      • ranitidine (ZANTAC) 75 MG tablet Take 75 mg by mouth 2 (Two) Times a Day As Needed.     • Scopolamine (TRANSDERM-SCOP) 1.5 MG/3DAYS patch Place 1 patch on the skin Every 72 (Seventy-Two) Hours. 10 patch 0   • Unable to  "find Med Name: oxyquinoline-emollient   Dose: 1 application  Route: Topical  Frequency: Daily     • vitamin B-12 (CYANOCOBALAMIN) 1000 MCG tablet Take 500 mcg by mouth Daily.       No current facility-administered medications for this visit.      Review of Systems   Unobtainable         OBJECTIVE    Visit Vitals   • Ht 60\" (152.4 cm)   • BMI 23.24 kg/m2       Constitutional: well developed, well nourished    Respiratory: Non labored respirations noted    Cardiovascular:  Dorsalis Pedis and Posterior Tibial pulses non-palpable bilateral. Capillary fill time brisk  to all digits bilateral. Skin temp is warm to warm from proximal tibia to distal digits bilateral. Pedal hair grown absent bilateral. No erythema or edema noted bilateral.    Musculoskeletal: Pes planus. Contracted digits bilateral    Dermatological: Nails 1-5 are thickened, discolored and elongated with subungual debris bilateral. Skin is warm, dry and intact bilateral. Webspaces 1 on the right and 4 on the left are macerated. No subcutaneous nodules or masses noted bilateral.     Neurological: Unable to assess        Procedures        ASSESSMENT AND PLAN    Мария was seen today for diabetic nail care and diabetic nail care.    Diagnoses and all orders for this visit:    Onychomycosis    Chronic pain of toe of left foot    Chronic pain of toe of right foot    Diabetic foot      A diabetic foot screening exam was performed and the patient was educated on the foot complications related to diabetes,  preventative foot care and what signs and symptoms to watch for.  Instructed to contact our office if any foot problems develop before next visit.  Nails 1 through 5 debrided in length and thickness without incident utilizing nail nippers.  All questions were answered and the patient is in agreement with the current treatment plan.   return to clinic in 3 months.              This document has been electronically signed by Jayme Jacinto DPM on February 24, " 2017 10:07 AM

## 2017-02-27 NOTE — PROGRESS NOTES
Subjective   Мария López is a 89 y.o. female.     Chief Complaint   Patient presents with   • Follow-up     hospital nitin fluid     History of Present Illness     Recent hospitalization for CHF. Is doing much better. Eating and drinking well. Labs and xrays reviewed. Medications reviewed and reconciled. Is on lasix daily.     The following portions of the patient's history were reviewed and updated as appropriate: allergies, current medications, past social history and problem list.    Current Outpatient Prescriptions:   •  allopurinol (ZYLOPRIM) 100 MG tablet, TAKE 1 TABLET BY MOUTH EVERY DAY FOR GOUT, Disp: 30 tablet, Rfl: 6  •  aspirin (ASPIRIN LOW DOSE) 81 MG tablet, Take 81 mg by mouth daily., Disp: , Rfl:   •  cholecalciferol (VITAMIN D3) 1000 UNITS tablet, Take 1,000 Units by mouth 2 (Two) Times a Day., Disp: , Rfl:   •  CHOLECALCIFEROL PO, Take 1 tablet by mouth 2 (two) times a day., Disp: , Rfl:   •  donepezil (ARICEPT) 10 MG tablet, Take 10 mg by mouth daily., Disp: , Rfl:   •  furosemide (LASIX) 20 MG tablet, Take 1 tablet by mouth Daily., Disp: 30 tablet, Rfl: 0  •  hydrocortisone-zinc oxide-bacitracin-nystatin cream, Apply 1 application topically 3 (Three) Times a Day., Disp: 4 g, Rfl: 3  •  ipratropium (ATROVENT) 0.02 % nebulizer solution, Take 2.5 mL by nebulization 4 (Four) Times a Day As Needed for wheezing or shortness of air., Disp: 100 mL, Rfl: 0  •  losartan (COZAAR) 25 MG tablet, TAKE 1/2 TABLET BY MOUTH DAILY AS NEEDED IF SYSTOLIC BLOOD PRESSURE OVER 150, Disp: 15 tablet, Rfl: 2  •  megestrol (MEGACE) 40 MG/ML suspension, TAKE 5 ML BY MOUTH 2 TIMES PER DAY, Disp: 300 mL, Rfl: 3  •  Multiple Vitamins-Minerals (MULTIVITAMIN PO), Take 1 tablet by mouth daily., Disp: , Rfl:   •  nystatin (MYCOSTATIN) 104029 UNIT/GM ointment, , Disp: , Rfl:   •  ranitidine (ZANTAC) 75 MG tablet, Take 75 mg by mouth 2 (Two) Times a Day As Needed., Disp: , Rfl:   •  Scopolamine (TRANSDERM-SCOP) 1.5 MG/3DAYS patch,  "Place 1 patch on the skin Every 72 (Seventy-Two) Hours., Disp: 10 patch, Rfl: 0  •  Unable to find, Med Name: oxyquinoline-emollient  Dose: 1 application Route: Topical Frequency: Daily, Disp: , Rfl:   •  vitamin B-12 (CYANOCOBALAMIN) 1000 MCG tablet, Take 500 mcg by mouth Daily., Disp: , Rfl:     Review of Systems   Constitutional: Negative for activity change, appetite change, chills, fatigue, fever and unexpected weight change.   HENT: Negative.  Negative for congestion, ear pain, hearing loss, nosebleeds, rhinorrhea, sinus pressure, sneezing, sore throat, tinnitus and trouble swallowing.    Eyes: Negative.  Negative for pain, discharge, redness, itching and visual disturbance.   Respiratory: Negative.  Negative for apnea, cough, chest tightness, shortness of breath and wheezing.    Cardiovascular: Negative.  Negative for chest pain, palpitations and leg swelling.   Gastrointestinal: Negative.  Negative for abdominal distention, abdominal pain, constipation, diarrhea, nausea and vomiting.   Endocrine: Negative.    Genitourinary: Negative.  Negative for dysuria, frequency and urgency.   Musculoskeletal: Negative.  Negative for arthralgias, back pain, gait problem, joint swelling, myalgias, neck pain and neck stiffness.   Skin: Negative.  Negative for color change and rash.   Allergic/Immunologic: Negative.    Neurological: Positive for weakness. Negative for dizziness, light-headedness, numbness and headaches.   Hematological: Negative.  Negative for adenopathy.   Psychiatric/Behavioral: Positive for confusion. Negative for dysphoric mood and sleep disturbance. The patient is not nervous/anxious.      Objective     Visit Vitals   • /70 (BP Location: Left arm, Patient Position: Sitting, Cuff Size: Adult)   • Pulse 64   • Ht 60\" (152.4 cm)   • Wt 119 lb (54 kg)   • SpO2 95%   • BMI 23.24 kg/m2     Physical Exam   Constitutional: She appears well-developed and well-nourished. No distress.   HENT:   Head: " Normocephalic and atraumatic.   Nose: Nose normal.   Mouth/Throat: Oropharynx is clear and moist.   Eyes: Conjunctivae and EOM are normal. Pupils are equal, round, and reactive to light. Right eye exhibits no discharge. Left eye exhibits no discharge.   Neck: No thyromegaly present.   Cardiovascular: Normal rate, regular rhythm, normal heart sounds and intact distal pulses.    Pulmonary/Chest: Effort normal and breath sounds normal.   Lymphadenopathy:     She has no cervical adenopathy.   Neurological: She is alert. She is disoriented.   Skin: Skin is warm and dry.   Psychiatric: She has a normal mood and affect.   Nursing note and vitals reviewed.    Assessment/Plan     Problem List Items Addressed This Visit        Cardiovascular and Mediastinum    Acute on chronic congestive heart failure - Primary       Nervous and Auditory    Senile dementia        Continue current treatment. Decrease lasix to every other day after 1 week.     New Medications Ordered This Visit   Medications   • cholecalciferol (VITAMIN D3) 1000 UNITS tablet     Sig: Take 1,000 Units by mouth 2 (Two) Times a Day.

## 2017-06-07 NOTE — PROGRESS NOTES
Мария López  10/20/1918  89 y.o. female   Patient presents today for a recheck of her right 2nd toe.    06/07/17    Chief Complaint   Patient presents with   • Right Foot - Follow-up           History of Present Illness    Patient returns to clinic today accompanied by her daughter for recheck of her right second toe ulcer.  They have been dressing it daily as instructed.  No new pedal complaints.        Past Medical History:   Diagnosis Date   • Age-related physical debility    • Anxiety disorder    • Atopic dermatitis and related condition    • Benign essential hypertension    • Benign hypertension    • Borderline glaucoma    • Chronic kidney disease    • Cobalamin deficiency    • Cortical senile cataract    • Diabetes mellitus    • Diabetes mellitus without complication    • Diabetic polyneuropathy    • Edema     But stable   • Essential hypertension    • Gastroesophageal reflux disease    • Gout    • Hallux valgus, acquired, bilateral    • Hand joint pain    • Malaise and fatigue    • Memory impairment    • Memory loss    • Need for prophylactic vaccination and inoculation against influenza    • Neurologic disorder associated with diabetes mellitus    • Nuclear cataract    • Onychogryposis    • Pain in lower limb    • Peripheral venous insufficiency    • Renal failure syndrome    • Senile dementia    • Sick sinus syndrome    • Swelling of limb     Right hand and forearm      • Type 2 diabetes mellitus          Past Surgical History:   Procedure Laterality Date   • APPENDECTOMY  07/21/1970    incidental   • CENTRAL VENOUS LINE INSERTION  04/05/2016    Successful placement of right upper extremity 6-Portuguese triple lumen PICC line.   • FINGER NAIL SURGERY      Debride nail 6 or more x9   • INJECTION OF MEDICATION      Kenalog x2   • TOTAL ABDOMINAL HYSTERECTOMY WITH SALPINGO OOPHORECTOMY  07/21/1970    uterine myomata         Family History   Problem Relation Age of Onset   • Coronary artery disease Other    •  Cancer Other    • Diabetes Other    • Hypertension Other          Social History     Social History   • Marital status:      Spouse name: N/A   • Number of children: N/A   • Years of education: N/A     Occupational History   • Not on file.     Social History Main Topics   • Smoking status: Never Smoker   • Smokeless tobacco: Not on file   • Alcohol use No   • Drug use: Not on file   • Sexual activity: Not on file     Other Topics Concern   • Not on file     Social History Narrative         Current Outpatient Prescriptions   Medication Sig Dispense Refill   • allopurinol (ZYLOPRIM) 100 MG tablet TAKE 1 TABLET BY MOUTH EVERY DAY FOR GOUT 30 tablet 6   • aspirin (ASPIRIN LOW DOSE) 81 MG tablet Take 81 mg by mouth daily.     • cholecalciferol (VITAMIN D3) 1000 UNITS tablet Take 1,000 Units by mouth 2 (Two) Times a Day.     • CHOLECALCIFEROL PO Take 1 tablet by mouth 2 (two) times a day.     • donepezil (ARICEPT) 10 MG tablet TAKE 1 TABLET BY MOUTH EVERY DAY 90 tablet 3   • furosemide (LASIX) 20 MG tablet Take 1 tablet by mouth Daily. 30 tablet 0   • hydrocortisone-zinc oxide-bacitracin-nystatin cream Apply 1 application topically 3 (Three) Times a Day. 4 g 3   • ipratropium (ATROVENT) 0.02 % nebulizer solution Take 2.5 mL by nebulization 4 (Four) Times a Day As Needed for wheezing or shortness of air. 100 mL 0   • losartan (COZAAR) 25 MG tablet TAKE 1/2 TABLET BY MOUTH DAILY AS NEEDED IF SYSTOLIC BLOOD PRESSURE OVER 150 15 tablet 2   • megestrol (MEGACE) 40 MG/ML suspension TAKE 5 ML BY MOUTH 2 TIMES PER  mL 3   • Multiple Vitamins-Minerals (MULTIVITAMIN PO) Take 1 tablet by mouth daily.     • nystatin (MYCOSTATIN) 241651 UNIT/GM ointment      • ranitidine (ZANTAC) 75 MG tablet Take 75 mg by mouth 2 (Two) Times a Day As Needed.     • Scopolamine (TRANSDERM-SCOP) 1.5 MG/3DAYS patch Place 1 patch on the skin Every 72 (Seventy-Two) Hours. 10 patch 0   • Unable to find Med Name: oxyquinoline-emollient   Dose:  "1 application  Route: Topical  Frequency: Daily     • vitamin B-12 (CYANOCOBALAMIN) 1000 MCG tablet Take 500 mcg by mouth Daily.       No current facility-administered medications for this visit.      Review of Systems   Unobtainable         OBJECTIVE    Ht 60\" (152.4 cm)  Wt 119 lb (54 kg)  BMI 23.24 kg/m2    Constitutional: well developed, well nourished    Respiratory: Non labored respirations noted    Cardiovascular:  Dorsalis Pedis and Posterior Tibial pulses non-palpable bilateral. Capillary fill time brisk  to all digits bilateral. Skin temp is warm to warm from proximal tibia to distal digits bilateral. Pedal hair grown absent bilateral. No erythema or edema noted bilateral.    Musculoskeletal: Pes planus. Contracted digits bilateral    Dermatological: Nails 1-5 are thickened, discolored and elongated with subungual debris bilateral. ulcer noted to the dorsal aspect of the right second digit.  It measures approximately 0.2 cm x 0.1 cm x 0.1 cm.  Base is fibro-granular.  No signs of infection noted.  It does not probe to bone. No subcutaneous nodules or masses noted bilateral.     Neurological: Unable to assess        Procedures        ASSESSMENT AND PLAN    Мария was seen today for follow-up.    Diagnoses and all orders for this visit:    Ulcer of right second toe with fat layer exposed    - Ulcer dressed with medihoney and a Band-Aid.  Daily dressing changes at home.  - All questions were answered and the patient is in agreement with the current treatment plan.   - Return to clinic in 3 weeks              This document has been electronically signed by Jayme Jacinto DPM on June 7, 2017 12:26 PM       EMR Dragon/Transcription disclaimer:   Much of this encounter note is an electronic transcription/translation of spoken language to printed text. The electronic translation of spoken language may permit erroneous, or at times, nonsensical words or phrases to be inadvertently transcribed; Although I have " reviewed the note for such errors, some may still exist.

## 2017-06-28 NOTE — PROGRESS NOTES
Мария López  10/20/1918  89 y.o. female   Patient presents today for a recheck of her right 2nd toe.    06/28/17    Chief Complaint   Patient presents with   • Right Foot - Follow-up           History of Present Illness    Patient returns to clinic today accompanied by her daughter for recheck of her right second toe ulcer.  They have been dressing it daily as instructed.  No new pedal complaints.        Past Medical History:   Diagnosis Date   • Age-related physical debility    • Anxiety disorder    • Atopic dermatitis and related condition    • Benign essential hypertension    • Benign hypertension    • Borderline glaucoma    • Chronic kidney disease    • Cobalamin deficiency    • Cortical senile cataract    • Diabetes mellitus    • Diabetes mellitus without complication    • Diabetic polyneuropathy    • Edema     But stable   • Essential hypertension    • Gastroesophageal reflux disease    • Gout    • Hallux valgus, acquired, bilateral    • Hand joint pain    • Malaise and fatigue    • Memory impairment    • Memory loss    • Need for prophylactic vaccination and inoculation against influenza    • Neurologic disorder associated with diabetes mellitus    • Nuclear cataract    • Onychogryposis    • Pain in lower limb    • Peripheral venous insufficiency    • Renal failure syndrome    • Senile dementia    • Sick sinus syndrome    • Swelling of limb     Right hand and forearm      • Type 2 diabetes mellitus          Past Surgical History:   Procedure Laterality Date   • APPENDECTOMY  07/21/1970    incidental   • CENTRAL VENOUS LINE INSERTION  04/05/2016    Successful placement of right upper extremity 6-Austrian triple lumen PICC line.   • FINGER NAIL SURGERY      Debride nail 6 or more x9   • INJECTION OF MEDICATION      Kenalog x2   • TOTAL ABDOMINAL HYSTERECTOMY WITH SALPINGO OOPHORECTOMY  07/21/1970    uterine myomata         Family History   Problem Relation Age of Onset   • Coronary artery disease Other    •  Cancer Other    • Diabetes Other    • Hypertension Other          Social History     Social History   • Marital status:      Spouse name: N/A   • Number of children: N/A   • Years of education: N/A     Occupational History   • Not on file.     Social History Main Topics   • Smoking status: Never Smoker   • Smokeless tobacco: Not on file   • Alcohol use No   • Drug use: Not on file   • Sexual activity: Not on file     Other Topics Concern   • Not on file     Social History Narrative         Current Outpatient Prescriptions   Medication Sig Dispense Refill   • allopurinol (ZYLOPRIM) 100 MG tablet TAKE 1 TABLET BY MOUTH EVERY DAY FOR GOUT 30 tablet 6   • aspirin (ASPIRIN LOW DOSE) 81 MG tablet Take 81 mg by mouth daily.     • cholecalciferol (VITAMIN D3) 1000 UNITS tablet Take 1,000 Units by mouth 2 (Two) Times a Day.     • CHOLECALCIFEROL PO Take 1 tablet by mouth 2 (two) times a day.     • donepezil (ARICEPT) 10 MG tablet TAKE 1 TABLET BY MOUTH EVERY DAY 90 tablet 3   • furosemide (LASIX) 20 MG tablet Take 1 tablet by mouth Daily. 30 tablet 0   • hydrocortisone-zinc oxide-bacitracin-nystatin cream Apply 1 application topically 3 (Three) Times a Day. 4 g 3   • ipratropium (ATROVENT) 0.02 % nebulizer solution Take 2.5 mL by nebulization 4 (Four) Times a Day As Needed for wheezing or shortness of air. 100 mL 0   • losartan (COZAAR) 25 MG tablet TAKE 1/2 TABLET BY MOUTH DAILY AS NEEDED IF SYSTOLIC BLOOD PRESSURE OVER 150 15 tablet 2   • megestrol (MEGACE) 40 MG/ML suspension TAKE 5 ML BY MOUTH 2 TIMES PER  mL 3   • Multiple Vitamins-Minerals (MULTIVITAMIN PO) Take 1 tablet by mouth daily.     • nystatin (MYCOSTATIN) 134988 UNIT/GM ointment      • ranitidine (ZANTAC) 75 MG tablet Take 75 mg by mouth 2 (Two) Times a Day As Needed.     • Scopolamine (TRANSDERM-SCOP) 1.5 MG/3DAYS patch Place 1 patch on the skin Every 72 (Seventy-Two) Hours. 10 patch 0   • Unable to find Med Name: oxyquinoline-emollient   Dose:  "1 application  Route: Topical  Frequency: Daily     • vitamin B-12 (CYANOCOBALAMIN) 1000 MCG tablet Take 500 mcg by mouth Daily.       No current facility-administered medications for this visit.      Review of Systems   Unobtainable         OBJECTIVE    Ht 60\" (152.4 cm)  Wt 119 lb (54 kg)  BMI 23.24 kg/m2    Constitutional: well developed, well nourished    Respiratory: Non labored respirations noted    Cardiovascular:  Dorsalis Pedis and Posterior Tibial pulses non-palpable bilateral. Capillary fill time brisk  to all digits bilateral. Skin temp is warm to warm from proximal tibia to distal digits bilateral. Pedal hair grown absent bilateral. No erythema or edema noted bilateral.    Musculoskeletal: Pes planus. Contracted digits bilateral    Dermatological: Nails 1-5 are thickened, discolored and elongated with subungual debris bilateral. ulcer noted to the dorsal aspect of the right second digit is healed. No subcutaneous nodules or masses noted bilateral.     Neurological: Unable to assess        Procedures        ASSESSMENT AND PLAN    Мария was seen today for follow-up.    Diagnoses and all orders for this visit:    Ulcer of right second toe with fat layer exposed    - dispensed gel toe sleeve  - All questions were answered and the patient is in agreement with the current treatment plan.   - Return to clinic at next scheduled follow up              This document has been electronically signed by Jayme Jacinto DPM on June 28, 2017 12:50 PM       EMR Dragon/Transcription disclaimer:   Much of this encounter note is an electronic transcription/translation of spoken language to printed text. The electronic translation of spoken language may permit erroneous, or at times, nonsensical words or phrases to be inadvertently transcribed; Although I have reviewed the note for such errors, some may still exist.          "

## 2017-10-11 NOTE — PROGRESS NOTES
Мария López  10/20/1918  89 y.o. female   todd - 2/23/2017  BS-104 per pts daughter    Patient presents today for routine diabetic foot care.    10/11/17    Chief Complaint   Patient presents with   • Left Foot - nail care   • Right Foot - nail care           History of Present Illness    Patient returns to clinic today accompanied by her daughters for routine nail care. No new pedal complaints.        Past Medical History:   Diagnosis Date   • Age-related physical debility    • Anxiety disorder    • Atopic dermatitis and related condition    • Benign essential hypertension    • Benign hypertension    • Borderline glaucoma    • Chronic kidney disease    • Cobalamin deficiency    • Cortical senile cataract    • Diabetes mellitus    • Diabetes mellitus without complication    • Diabetic polyneuropathy    • Edema     But stable   • Essential hypertension    • Gastroesophageal reflux disease    • Gout    • Hallux valgus, acquired, bilateral    • Hand joint pain    • Malaise and fatigue    • Memory impairment    • Memory loss    • Need for prophylactic vaccination and inoculation against influenza    • Neurologic disorder associated with diabetes mellitus    • Nuclear cataract    • Onychogryposis    • Pain in lower limb    • Peripheral venous insufficiency    • Renal failure syndrome    • Senile dementia    • Sick sinus syndrome    • Swelling of limb     Right hand and forearm      • Type 2 diabetes mellitus          Past Surgical History:   Procedure Laterality Date   • APPENDECTOMY  07/21/1970    incidental   • CENTRAL VENOUS LINE INSERTION  04/05/2016    Successful placement of right upper extremity 6-Norwegian triple lumen PICC line.   • FINGER NAIL SURGERY      Debride nail 6 or more x9   • INJECTION OF MEDICATION      Kenalog x2   • TOTAL ABDOMINAL HYSTERECTOMY WITH SALPINGO OOPHORECTOMY  07/21/1970    uterine myomata         Family History   Problem Relation Age of Onset   • Coronary artery disease Other    •  Cancer Other    • Diabetes Other    • Hypertension Other          Social History     Social History   • Marital status:      Spouse name: N/A   • Number of children: N/A   • Years of education: N/A     Occupational History   • Not on file.     Social History Main Topics   • Smoking status: Never Smoker   • Smokeless tobacco: Not on file   • Alcohol use No   • Drug use: Not on file   • Sexual activity: Not on file     Other Topics Concern   • Not on file     Social History Narrative         Current Outpatient Prescriptions   Medication Sig Dispense Refill   • allopurinol (ZYLOPRIM) 100 MG tablet TAKE 1 TABLET BY MOUTH EVERY DAY FOR GOUT 30 tablet 6   • aspirin (ASPIRIN LOW DOSE) 81 MG tablet Take 81 mg by mouth daily.     • cholecalciferol (VITAMIN D3) 1000 UNITS tablet Take 1,000 Units by mouth 2 (Two) Times a Day.     • CHOLECALCIFEROL PO Take 1 tablet by mouth 2 (two) times a day.     • donepezil (ARICEPT) 10 MG tablet TAKE 1 TABLET BY MOUTH EVERY DAY 90 tablet 3   • furosemide (LASIX) 20 MG tablet Take 1 tablet by mouth Daily. 30 tablet 0   • hydrocortisone-zinc oxide-bacitracin-nystatin cream Apply 1 application topically 3 (Three) Times a Day. 4 g 3   • ipratropium (ATROVENT) 0.02 % nebulizer solution Take 2.5 mL by nebulization 4 (Four) Times a Day As Needed for wheezing or shortness of air. 100 mL 0   • losartan (COZAAR) 25 MG tablet TAKE 1/2 TABLET BY MOUTH DAILY AS NEEDED IF SYSTOLIC BLOOD PRESSURE OVER 150 15 tablet 2   • megestrol (MEGACE) 40 MG/ML suspension TAKE 5 ML BY MOUTH 2 TIMES PER  mL 3   • Multiple Vitamins-Minerals (MULTIVITAMIN PO) Take 1 tablet by mouth daily.     • nystatin (MYCOSTATIN) 553968 UNIT/GM ointment      • ranitidine (ZANTAC) 75 MG tablet Take 75 mg by mouth 2 (Two) Times a Day As Needed.     • Scopolamine (TRANSDERM-SCOP) 1.5 MG/3DAYS patch Place 1 patch on the skin Every 72 (Seventy-Two) Hours. 10 patch 0   • Unable to find Med Name: oxyquinoline-emollient   Dose:  "1 application  Route: Topical  Frequency: Daily     • vitamin B-12 (CYANOCOBALAMIN) 1000 MCG tablet Take 500 mcg by mouth Daily.       No current facility-administered medications for this visit.      Review of Systems   Unobtainable         OBJECTIVE    Ht 60\" (152.4 cm)  Wt 119 lb (54 kg)  BMI 23.24 kg/m2    Constitutional: well developed, well nourished    Respiratory: Non labored respirations noted    Cardiovascular:  Dorsalis Pedis and Posterior Tibial pulses non-palpable bilateral. Capillary fill time brisk  to all digits bilateral. Skin temp is warm to warm from proximal tibia to distal digits bilateral. Pedal hair grown absent bilateral. No erythema or edema noted bilateral.    Musculoskeletal: Pes planus. Contracted digits bilateral    Dermatological: Nails 1-5 are thickened, elongated , discolored and elongated with subungual debris bilateral. ulcer noted to the dorsal aspect of the right second digit is healed. No subcutaneous nodules or masses noted bilateral.     Neurological: Unable to assess        Procedures        ASSESSMENT AND PLAN    Мария was seen today for nail care and nail care.    Diagnoses and all orders for this visit:    Onychomycosis    Chronic toe pain, bilateral    Type 2 diabetes mellitus without complication, unspecified long term insulin use status    - Nails 1 through 5 bilateral were debrided in length and thickness without incident utilizing nail nippers.  - All questions were answered    - Return to clinic 3 months              This document has been electronically signed by Jayme Jacinto DPM on October 11, 2017 12:51 PM         "

## 2017-11-25 PROBLEM — J18.9 PNEUMONIA DUE TO ORGANISM: Status: ACTIVE | Noted: 2017-01-01

## 2017-11-27 PROBLEM — N17.9 ACUTE RENAL FAILURE SUPERIMPOSED ON CHRONIC KIDNEY DISEASE (HCC): Status: ACTIVE | Noted: 2017-01-01

## 2017-11-27 PROBLEM — A41.9 SEVERE SEPSIS WITH SEPTIC SHOCK (HCC): Status: ACTIVE | Noted: 2017-01-01

## 2017-11-27 PROBLEM — N18.9 ACUTE RENAL FAILURE SUPERIMPOSED ON CHRONIC KIDNEY DISEASE (HCC): Status: ACTIVE | Noted: 2017-01-01

## 2017-11-27 PROBLEM — R65.21 SEVERE SEPSIS WITH SEPTIC SHOCK (HCC): Status: ACTIVE | Noted: 2017-01-01

## 2017-12-01 NOTE — PAYOR COMM NOTE
"Мария Watson (Dcsd. Female)     Date of Birth Social Security Number Address Home Phone MRN    10/20/1918  [address] [place] [address] [phone] [id number]    Worship Marital Status          [employer]        Admission Date Admission Type Admitting Provider Attending Provider Department, Room/Bed    17 Emergency Priyanka Kovacs MD  [address], 411/1    Discharge Date Discharge Disposition Discharge Destination        2017              Attending Provider: (none)    Allergies:  Albuterol, Univasc [Moexipril]    Isolation:  None   Infection:  None   Code Status:  Prior    Ht:  62\" (157.5 cm)   Wt:  121 lb 7.6 oz (55.1 kg)    Admission Cmt:  None   Principal Problem:  Severe sepsis with septic shock [A41.9,R65.21]                 Active Insurance as of 2017     Primary Coverage     Payor Plan Insurance Group Employer/Plan Group    MEDICARE MEDICARE A & B      Payor Plan Address Payor Plan Phone Number Effective From Effective To    [address] [phone] 10/1/1982     [place] [address]       Subscriber Name Subscriber Birth Date Member ID       МАРИЯ WATSON 10/20/1918 [id number]           Secondary Coverage     Payor Plan Insurance Group Employer/Plan Group    [facility]     Payor Plan Address Payor Plan Phone Number Effective From Effective To    [address] [address]  2017     [place] [address]       Subscriber Name Subscriber Birth Date Member ID       МАРИЯ WATSON 10/20/1918 [id number]                 Emergency Contacts      (Rel.) Home Phone Work Phone Mobile Phone    Jyotsna Long (Daughter) [phone]    Caitlin Hu (Daughter) [phone]            Discharge Order     None        "

## 2017-12-07 NOTE — DISCHARGE SUMMARY
[facility]  DEATH SUMMARY       Date of Admission: 11/25/2017  Date of Death:  11/30/2017 at approximately 0205  Primary Care Physician: Yolette Egan MD    Presenting Problem/History of Present Illness:  Dehydration [E86.0]  Pneumonia due to organism [J18.9]  Hypothermia, initial encounter [T68.XXXA]     Final Death Diagnoses:  Hospital Problem List     * (Principal)Severe sepsis with septic shock    Type 2 diabetes mellitus    Sick sinus syndrome    Senile dementia    Essential hypertension    Acute on chronic congestive heart failure    Pneumonia due to organism    Acute renal failure superimposed on chronic kidney disease          Consults:   Consults     No orders found from 10/27/2017 to 11/26/2017.          Procedures Performed:                 Pertinent Test Results:   Lab Results (all)     Procedure Component Value Units Date/Time    Calcium, Ionized [[id number]]  (Abnormal) Collected:  11/25/17 1412    Specimen:  Blood Updated:  11/25/17 1414     Ionized Calcium 5.00 (H) mg/dL     CBC & Differential [[id number]] Collected:  11/25/17 1412    Specimen:  Blood Updated:  11/25/17 1416    Narrative:       The following orders were created for panel order CBC & Differential.  Procedure                               Abnormality         Status                     ---------                               -----------         ------                     CBC Auto Differential[[id number]]        Abnormal            Final result                 Please view results for these tests on the individual orders.    CBC Auto Differential [[id number]]  (Abnormal) Collected:  11/25/17 1412    Specimen:  Blood Updated:  11/25/17 1416     WBC 8.50 10*3/mm3      RBC 3.90 10*6/mm3      Hemoglobin 12.2 g/dL      Hematocrit 34.4 (L) %      MCV 88.2 fL      MCH 31.3 pg      MCHC 35.5 g/dL      RDW 18.3 (H) %      RDW-SD 57.8 (H) fl      MPV 10.5 fL      Platelets 219 10*3/mm3       Neutrophil % 82.7 (H) %      Lymphocyte % 12.8 %      Monocyte % 4.0 %      Eosinophil % 0.2 %      Basophil % 0.1 %      Immature Grans % 0.2 %      Neutrophils, Absolute 7.02 10*3/mm3      Lymphocytes, Absolute 1.09 10*3/mm3      Monocytes, Absolute 0.34 10*3/mm3      Eosinophils, Absolute 0.02 10*3/mm3      Basophils, Absolute 0.01 10*3/mm3      Immature Grans, Absolute 0.02 10*3/mm3     Lavender Top [[id number]] Collected:  11/25/17 1412    Specimen:  Blood Updated:  11/25/17 1516     Extra Tube hold for add-on      Auto resulted       Protime-INR [[id number]]  (Normal) Collected:  11/25/17 1457    Specimen:  Blood from Hand, Left Updated:  11/25/17 1531     Protime 12.2 Seconds      INR 0.91    Narrative:       Therapeutic range for most indications is 2.0-3.0 INR,  or 2.5-3.5 for mechanical heart valves.    Urinalysis With / Culture If Indicated - Urine, Catheter [[id number]]  (Abnormal) Collected:  11/25/17 1531    Specimen:  Urine from Urine, Catheter Updated:  11/25/17 1542     Color, UA Dark Yellow     Appearance, UA Clear     pH, UA 5.5     Specific Gravity, UA 1.019     Glucose, UA Negative     Ketones, UA Trace (A)     Bilirubin, UA Negative     Blood, UA Negative     Protein,  mg/dL (2+) (A)     Leuk Esterase, UA Negative     Nitrite, UA Negative     Urobilinogen, UA 0.2 E.U./dL    Urinalysis, Microscopic Only - Urine, Clean Catch [[id number]]  (Abnormal) Collected:  11/25/17 1531    Specimen:  Urine from Urine, Catheter Updated:  11/25/17 1543     RBC, UA 0-2 (A) /HPF      WBC, UA None Seen /HPF      Bacteria, UA None Seen /HPF      Squamous Epithelial Cells, UA None Seen /HPF      Hyaline Casts, UA 0-2 /LPF      Methodology Automated Microscopy    Light Blue Top [[id number]] Collected:  11/25/17 1457    Specimen:  Blood from Hand, Left Updated:  11/25/17 1601     Extra Tube hold for add-on      Auto resulted       Blood Gas, Arterial [[id number]]  (Abnormal) Collected:  11/25/17 1642    Specimen:   Arterial Blood Updated:  11/25/17 1644     Site --      Not performed at this site.        Efrem's Test --      Not performed at this site.        pH, Arterial 7.397 pH units      pCO2, Arterial 31.5 (L) mm Hg      pO2, Arterial 142.1 (H) mm Hg      HCO3, Arterial 18.9 (L) mmol/L      Base Excess, Arterial -5.1 (L) mmol/L      O2 Saturation, Arterial 98.6 %      Hemoglobin, Blood Gas 9.7 (L) g/dL      Hematocrit, Blood Gas 29.0 (L) %      CO2 Content 19.9 (L)     Sodium, Arterial 145.2 mmol/L      Potassium, Arterial 3.86 mmol/L      Glucose, Arterial 84 mmol/L      Barometric Pressure for Blood Gas -- mmHg       Not performed at this site.        Modality --      Not performed at this site.        Ionized Calcium 5.10 (H) mg/dL     Lactic Acid, Plasma [[id number]]  (Normal) Collected:  11/25/17 1715    Specimen:  Blood Updated:  11/25/17 1728     Lactate 1.3 mmol/L     Magnesium [[id number]]  (Normal) Collected:  11/25/17 1715    Specimen:  Blood Updated:  11/25/17 1750     Magnesium 2.2 mg/dL     Phosphorus [[id number]]  (Normal) Collected:  11/25/17 1715    Specimen:  Blood Updated:  11/25/17 1750     Phosphorus 3.0 mg/dL     C-reactive Protein [[id number]]  (Normal) Collected:  11/25/17 1715    Specimen:  Blood Updated:  11/25/17 1750     C-Reactive Protein 0.90 mg/dL     Comprehensive Metabolic Panel [[id number]]  (Abnormal) Collected:  11/25/17 1715    Specimen:  Blood Updated:  11/25/17 1753     Glucose 83 mg/dL      BUN 42 (H) mg/dL      Creatinine 1.67 (H) mg/dL      Sodium 145 mmol/L      Potassium 4.0 mmol/L      Chloride 119 (H) mmol/L      CO2 20.0 (L) mmol/L      Calcium 8.8 mg/dL      Total Protein 5.0 (L) g/dL      Albumin 2.20 (L) g/dL      ALT (SGPT) 34 U/L      AST (SGOT) 19 U/L      Alkaline Phosphatase 135 (H) U/L      Total Bilirubin <0.1 (L) mg/dL      eGFR   Amer 34 (L) mL/min/1.73      Globulin 2.8 gm/dL      A/G Ratio 0.8 (L) g/dL      BUN/Creatinine Ratio 25.1 (H)     Anion Gap 6.0  mmol/L     Narrative:       The MDRD GFR formula is only valid for adults with stable renal function between ages 18 and 70.    aPTT [[id number]]  (Abnormal) Collected:  11/25/17 1715    Specimen:  Blood Updated:  11/25/17 1803     PTT <20.0 (L) seconds     Narrative:       The recommended Heparin therapeutic range is 68-97 seconds.    [place] Draw [[id number]] Collected:  11/25/17 1412    Specimen:  Blood Updated:  11/25/17 1816    Narrative:       The following orders were created for panel order [place] Draw.  Procedure                               Abnormality         Status                     ---------                               -----------         ------                     Light Blue Top[[id number]]                                   Final result               Green Top (Gel)[[id number]]                                  Final result               Lavender Top[[id number]]                                     Final result               Gold Top - SST[[id number]]                                   Final result                 Please view results for these tests on the individual orders.    Green Top (Gel) [[id number]] Collected:  11/25/17 1715    Specimen:  Blood Updated:  11/25/17 1816     Extra Tube Hold for add-ons.      Auto resulted.       Gold Top - SST [[id number]] Collected:  11/25/17 1715    Specimen:  Blood Updated:  11/25/17 1816     Extra Tube Hold for add-ons.      Auto resulted.       TSH [[id number]]  (Abnormal) Collected:  11/25/17 1715    Specimen:  Blood Updated:  11/25/17 1850     TSH 8.550 (H) mIU/mL     CBC (No Diff) [[id number]]  (Abnormal) Collected:  11/26/17 0253    Specimen:  Blood Updated:  11/26/17 0334     WBC 7.94 10*3/mm3      RBC 3.35 (L) 10*6/mm3      Hemoglobin 10.2 (L) g/dL      Hematocrit 29.6 (L) %      MCV 88.4 fL      MCH 30.4 pg      MCHC 34.5 g/dL      RDW 18.4 (H) %      RDW-SD 58.8 (H) fl      MPV 11.0 fL      Platelets 209 10*3/mm3     Comprehensive Metabolic Panel [[id number]]   (Abnormal) Collected:  11/26/17 0254    Specimen:  Blood Updated:  11/26/17 0351     Glucose 87 mg/dL      BUN 40 (H) mg/dL      Creatinine 2.01 (H) mg/dL      Sodium 149 (H) mmol/L      Potassium 4.1 mmol/L      Chloride 120 (H) mmol/L      CO2 18.0 (L) mmol/L      Calcium 9.1 mg/dL      Total Protein 6.2 (L) g/dL      Albumin 2.80 (L) g/dL      ALT (SGPT) 36 U/L      AST (SGOT) 71 (H) U/L      Alkaline Phosphatase 138 (H) U/L      Total Bilirubin 0.3 mg/dL      eGFR  African Amer 28 (L) mL/min/1.73      Globulin 3.4 gm/dL      A/G Ratio 0.8 (L) g/dL      BUN/Creatinine Ratio 19.9     Anion Gap 11.0 mmol/L     Narrative:       The MDRD GFR formula is only valid for adults with stable renal function between ages 18 and 70.    Vancomycin, Random [[id number]] Collected:  11/26/17 1236    Specimen:  Blood Updated:  11/26/17 1302     Vancomycin Random 10.84 mcg/mL     CBC Auto Differential [[id number]]  (Abnormal) Collected:  11/27/17 0230    Specimen:  Blood Updated:  11/27/17 0326     WBC 9.93 (H) 10*3/mm3      RBC 2.89 (L) 10*6/mm3      Hemoglobin 8.7 (L) g/dL      Hematocrit 25.6 (L) %      MCV 88.6 fL      MCH 30.1 pg      MCHC 34.0 g/dL      RDW 18.7 (H) %      RDW-SD 59.3 (H) fl      MPV 12.0 fL      Platelets 165 10*3/mm3      Neutrophil % 87.9 (H) %      Lymphocyte % 8.1 (L) %      Monocyte % 3.3 %      Eosinophil % 0.0 %      Basophil % 0.1 %      Immature Grans % 0.6 (H) %      Neutrophils, Absolute 8.73 (H) 10*3/mm3      Lymphocytes, Absolute 0.80 10*3/mm3      Monocytes, Absolute 0.33 10*3/mm3      Eosinophils, Absolute 0.00 10*3/mm3      Basophils, Absolute 0.01 10*3/mm3      Immature Grans, Absolute 0.06 (H) 10*3/mm3      nRBC 0.8 (H) /100 WBC     CBC & Differential [[id number]] Collected:  11/27/17 0230    Specimen:  Blood Updated:  11/27/17 2037    Narrative:       The following orders were created for panel order CBC & Differential.  Procedure                               Abnormality         Status                      ---------                               -----------         ------                     Scan Slide[[id number]]                                                                  CBC Auto Differential[[id number]]        Abnormal            Final result                 Please view results for these tests on the individual orders.    Comprehensive Metabolic Panel [[id number]]  (Abnormal) Collected:  11/27/17 0230    Specimen:  Blood Updated:  11/27/17 0350     Glucose 159 (H) mg/dL      BUN 40 (H) mg/dL      Creatinine 1.95 (H) mg/dL      Sodium 150 (H) mmol/L      Potassium 3.7 mmol/L      Chloride 117 (H) mmol/L      CO2 22.0 mmol/L      Calcium 8.5 mg/dL      Total Protein 5.0 (L) g/dL      Albumin 2.50 (L) g/dL      ALT (SGPT) 40 U/L      AST (SGOT) 146 (H) U/L      Alkaline Phosphatase 89 U/L      Total Bilirubin 0.3 mg/dL      eGFR  African Amer 29 (L) mL/min/1.73      Globulin 2.5 gm/dL      A/G Ratio 1.0 (L) g/dL      BUN/Creatinine Ratio 20.5     Anion Gap 11.0 mmol/L     Narrative:       The MDRD GFR formula is only valid for adults with stable renal function between ages 18 and 70.    Vancomycin, Random [[id number]] Collected:  11/27/17 0230    Specimen:  Blood Updated:  11/27/17 0429     Vancomycin Random 19.80 mcg/mL     Urine Culture - Urine, Urine, Clean Catch [[id number]]  (Normal) Collected:  11/26/17 1246    Specimen:  Urine from Urine, Clean Catch Updated:  11/28/17 0624     Urine Culture No growth at 24 hours    Blood Culture - Blood, [[id number]]  (Normal) Collected:  11/25/17 1411    Specimen:  Blood from Arm, Right Updated:  11/30/17 1416     Blood Culture No growth at 5 days    Blood Culture - Blood, [[id number]]  (Normal) Collected:  11/25/17 1715    Specimen:  Blood from Hand, Left Updated:  11/30/17 1731     Blood Culture No growth at 5 days            Hospital Course:  89-year-old -American female who was admitted on 11/25/2017 related to weakness and altered mental status.  The  patient was noted to be in septic shock on admission related to pneumonia.  The patient was also suffering from poor oral intake, dehydration which resulted in acute kidney injury.  The patient was initially admitted to the intensive care unit and treated with IV antibiotics, IV fluids, and eventually required pressors related to hypotension.  The patient's condition continued to decline despite treatment mentioned above.  The patient's family opted for comfort measures only on 17 and the patient  on 17 at approximately 0205 AM.          This document has been electronically signed by THOMPSON Barrera on 2017 8:58 AM

## 2017-12-15 ENCOUNTER — TELEPHONE (OUTPATIENT)
Dept: FAMILY MEDICINE CLINIC | Facility: CLINIC | Age: 82
End: 2017-12-15